# Patient Record
Sex: MALE | Race: WHITE | NOT HISPANIC OR LATINO | Employment: FULL TIME | URBAN - NONMETROPOLITAN AREA
[De-identification: names, ages, dates, MRNs, and addresses within clinical notes are randomized per-mention and may not be internally consistent; named-entity substitution may affect disease eponyms.]

---

## 2017-04-25 ENCOUNTER — OFFICE VISIT (OUTPATIENT)
Dept: URGENT CARE | Facility: PHYSICIAN GROUP | Age: 27
End: 2017-04-25
Payer: MEDICAID

## 2017-04-25 VITALS
HEIGHT: 67 IN | RESPIRATION RATE: 14 BRPM | DIASTOLIC BLOOD PRESSURE: 70 MMHG | TEMPERATURE: 98.1 F | HEART RATE: 84 BPM | SYSTOLIC BLOOD PRESSURE: 110 MMHG | WEIGHT: 210 LBS | BODY MASS INDEX: 32.96 KG/M2 | OXYGEN SATURATION: 96 %

## 2017-04-25 DIAGNOSIS — S46.311A TRICEPS STRAIN, RIGHT, INITIAL ENCOUNTER: ICD-10-CM

## 2017-04-25 PROCEDURE — 99214 OFFICE O/P EST MOD 30 MIN: CPT | Performed by: PHYSICIAN ASSISTANT

## 2017-04-25 ASSESSMENT — ENCOUNTER SYMPTOMS
FEVER: 0
CHILLS: 0
TINGLING: 0
LOSS OF CONSCIOUSNESS: 0
MUSCULOSKELETAL PAIN: 1
COUGH: 0
PALPITATIONS: 0
SHORTNESS OF BREATH: 0
FOCAL WEAKNESS: 0

## 2017-04-25 NOTE — PATIENT INSTRUCTIONS
Muscle Strain  A muscle strain is an injury that occurs when a muscle is stretched beyond its normal length. Usually a small number of muscle fibers are torn when this happens. Muscle strain is rated in degrees. First-degree strains have the least amount of muscle fiber tearing and pain. Second-degree and third-degree strains have increasingly more tearing and pain.   Usually, recovery from muscle strain takes 1-2 weeks. Complete healing takes 5-6 weeks.   CAUSES   Muscle strain happens when a sudden, violent force placed on a muscle stretches it too far. This may occur with lifting, sports, or a fall.   RISK FACTORS  Muscle strain is especially common in athletes.   SIGNS AND SYMPTOMS  At the site of the muscle strain, there may be:  · Pain.  · Bruising.  · Swelling.  · Difficulty using the muscle due to pain or lack of normal function.  DIAGNOSIS   Your health care provider will perform a physical exam and ask about your medical history.  TREATMENT   Often, the best treatment for a muscle strain is resting, icing, and applying cold compresses to the injured area.    HOME CARE INSTRUCTIONS   · Use the PRICE method of treatment to promote muscle healing during the first 2-3 days after your injury. The PRICE method involves:  ¨ Protecting the muscle from being injured again.  ¨ Restricting your activity and resting the injured body part.  ¨ Icing your injury. To do this, put ice in a plastic bag. Place a towel between your skin and the bag. Then, apply the ice and leave it on from 15-20 minutes each hour. After the third day, switch to moist heat packs.  ¨ Apply compression to the injured area with a splint or elastic bandage. Be careful not to wrap it too tightly. This may interfere with blood circulation or increase swelling.  ¨ Elevate the injured body part above the level of your heart as often as you can.  · Only take over-the-counter or prescription medicines for pain, discomfort, or fever as directed by your  health care provider.  · Warming up prior to exercise helps to prevent future muscle strains.  SEEK MEDICAL CARE IF:   · You have increasing pain or swelling in the injured area.  · You have numbness, tingling, or a significant loss of strength in the injured area.  MAKE SURE YOU:   · Understand these instructions.  · Will watch your condition.  · Will get help right away if you are not doing well or get worse.     This information is not intended to replace advice given to you by your health care provider. Make sure you discuss any questions you have with your health care provider.     Document Released: 12/18/2006 Document Revised: 10/08/2014 Document Reviewed: 07/17/2014  [employer] Interactive Patient Education ©2016 [employer]vier Inc.

## 2017-04-25 NOTE — PROGRESS NOTES
"Subjective:      José Miguel Bautista is a 27 y.o. male who presents with Muscle Pain            Muscle Pain  This is a new problem. The current episode started today. The problem occurs constantly. The problem has been unchanged. Pertinent negatives include no chest pain, chills, coughing, fever or rash. Associated symptoms comments: Sore right triceps muscle from lifting heavy objects.  . Exacerbated by: stretching arm. He has tried nothing for the symptoms.       Review of Systems   Constitutional: Negative for fever and chills.   Respiratory: Negative for cough and shortness of breath.    Cardiovascular: Negative for chest pain and palpitations.   Musculoskeletal:        Right upper arm pain   Skin: Negative for rash.   Neurological: Negative for tingling, focal weakness and loss of consciousness.     All other systems reviewed and are negative.  PMH:  has no past medical history on file.  MEDS:   Current outpatient prescriptions:   •  Diclofenac Sodium 1 % Gel, Apply 2-4 grams to affected area 4 times daily as needed for pain., Disp: 1 Tube, Rfl: 0  •  diphenhydrAMINE (BENADRYL) 25 MG TABS, Take 25 mg by mouth every 6 hours as needed for Sleep., Disp: , Rfl:   •  Diphenhydramine-APAP, sleep,  MG TABS, Take  by mouth., Disp: , Rfl:   •  fluticasone (FLONASE) 50 MCG/ACT nasal spray, Spray 2 Sprays in nose every day., Disp: 1 Bottle, Rfl: 1  ALLERGIES:   Allergies   Allergen Reactions   • Penicillins      SURGHX: History reviewed. No pertinent past surgical history.  SOCHX:  reports that he has been smoking.  He does not have any smokeless tobacco history on file. He reports that he drinks alcohol. He reports that he does not use illicit drugs.  FH: Family history was reviewed, no pertinent findings to report  Medications, Allergies, and current problem list reviewed today in Epic       Objective:     /70 mmHg  Pulse 84  Temp(Src) 36.7 °C (98.1 °F)  Resp 14  Ht 1.702 m (5' 7\")  Wt 95.255 kg (210 lb) "  BMI 32.88 kg/m2  SpO2 96%     Physical Exam   Constitutional: He is oriented to person, place, and time. He appears well-developed and well-nourished.   Cardiovascular: Normal rate, regular rhythm, normal heart sounds, intact distal pulses and normal pulses.    Pulmonary/Chest: Effort normal and breath sounds normal.   Musculoskeletal: Normal range of motion. He exhibits tenderness. He exhibits no edema or deformity.   Pain to palpation along the right triceps.  Full ROM.   Neurological: He is alert and oriented to person, place, and time. He has normal reflexes. He displays normal reflexes. He exhibits normal muscle tone. Coordination normal.   Skin: Skin is warm and dry.   Psychiatric: He has a normal mood and affect. His behavior is normal. Judgment and thought content normal.   Vitals reviewed.              Assessment/Plan:     1. Triceps strain, right, initial encounter  - ICE/NSAIDS/gentle stretching  - Diclofenac Sodium 1 % Gel; Apply 2-4 grams to affected area 4 times daily as needed for pain.  Dispense: 1 Tube; Refill: 0    Differential diagnosis, natural history, supportive care, and indications for immediate follow-up discussed at length.   Follow-up with primary care provider within 4-5 days, emergency room precautions discussed.  Patient and/or family appears understanding of information.

## 2017-04-25 NOTE — MR AVS SNAPSHOT
"José Miguel Bautista   2017 11:05 AM   Office Visit   MRN: [id number]    Department:  [facility]   Dept Phone:  [phone]    Description:  Male : 1990   Provider:  Elvin Stovall PA-C           Reason for Visit     Muscle Pain muscle strain in R/ bicept      Allergies as of 2017     Allergen Noted Reactions    Penicillins 2015         You were diagnosed with     Triceps strain, right, initial encounter   [[id number]]         Vital Signs     Blood Pressure Pulse Temperature Respirations Height Weight    110/70 mmHg 84 36.7 °C (98.1 °F) 14 1.702 m (5' 7\") 95.255 kg (210 lb)    Body Mass Index Oxygen Saturation Smoking Status             32.88 kg/m2 96% Current Every Day Smoker         Basic Information     Date Of Birth Sex Race Ethnicity Preferred Language    1990 Male White Non- English      Health Maintenance        Date Due Completion Dates    IMM DTaP/Tdap/Td Vaccine (1 - Tdap) 2008 ---            Current Immunizations     No immunizations on file.      Below and/or attached are the medications your provider expects you to take. Review all of your home medications and newly ordered medications with your provider and/or pharmacist. Follow medication instructions as directed by your provider and/or pharmacist. Please keep your medication list with you and share with your provider. Update the information when medications are discontinued, doses are changed, or new medications (including over-the-counter products) are added; and carry medication information at all times in the event of emergency situations     Allergies:  PENICILLINS - (reactions not documented)               Medications  Valid as of: 2017 - 11:22 AM    Generic Name Brand Name Tablet Size Instructions for use    Diclofenac Sodium (Gel) Diclofenac Sodium 1 % Apply 2-4 grams to affected area 4 times daily as needed for pain.        DiphenhydrAMINE HCl (Tab) BENADRYL 25 MG Take 25 mg by mouth " every 6 hours as needed for Sleep.        Diphenhydramine-APAP (sleep) (Tab) Diphenhydramine-APAP (sleep)  MG Take  by mouth.        Fluticasone Propionate (Suspension) FLONASE 50 MCG/ACT Spray 2 Sprays in nose every day.        .                 Medicines prescribed today were sent to:     [facility][id number] - [place], [place] - [address]    [address] [facility] [place] [address]    Phone: [phone] Fax: [phone]    Open 24 Hours?: No    [facility] [address] [facility], [place] - [address]    [address] [place] [address]    Phone: [phone] Fax: [phone]    Open 24 Hours?: No      Medication refill instructions:       If your prescription bottle indicates you have medication refills left, it is not necessary to call your provider’s office. Please contact your pharmacy and they will refill your medication.    If your prescription bottle indicates you do not have any refills left, you may request refills at any time through one of the following ways: The online [employer] system (except Urgent Care), by calling your provider’s office, or by asking your pharmacy to contact your provider’s office with a refill request. Medication refills are processed only during regular business hours and may not be available until the next business day. Your provider may request additional information or to have a follow-up visit with you prior to refilling your medication.   *Please Note: Medication refills are assigned a new Rx number when refilled electronically. Your pharmacy may indicate that no refills were authorized even though a new prescription for the same medication is available at the pharmacy. Please request the medicine by name with the pharmacy before contacting your provider for a refill.        Instructions    Muscle Strain  A muscle strain is an injury that occurs when a muscle is stretched beyond its normal length. Usually a small number of muscle fibers are torn when this  happens. Muscle strain is rated in degrees. First-degree strains have the least amount of muscle fiber tearing and pain. Second-degree and third-degree strains have increasingly more tearing and pain.   Usually, recovery from muscle strain takes 1-2 weeks. Complete healing takes 5-6 weeks.   CAUSES   Muscle strain happens when a sudden, violent force placed on a muscle stretches it too far. This may occur with lifting, sports, or a fall.   RISK FACTORS  Muscle strain is especially common in athletes.   SIGNS AND SYMPTOMS  At the site of the muscle strain, there may be:  · Pain.  · Bruising.  · Swelling.  · Difficulty using the muscle due to pain or lack of normal function.  DIAGNOSIS   Your health care provider will perform a physical exam and ask about your medical history.  TREATMENT   Often, the best treatment for a muscle strain is resting, icing, and applying cold compresses to the injured area.    HOME CARE INSTRUCTIONS   · Use the PRICE method of treatment to promote muscle healing during the first 2-3 days after your injury. The PRICE method involves:  ¨ Protecting the muscle from being injured again.  ¨ Restricting your activity and resting the injured body part.  ¨ Icing your injury. To do this, put ice in a plastic bag. Place a towel between your skin and the bag. Then, apply the ice and leave it on from 15-20 minutes each hour. After the third day, switch to moist heat packs.  ¨ Apply compression to the injured area with a splint or elastic bandage. Be careful not to wrap it too tightly. This may interfere with blood circulation or increase swelling.  ¨ Elevate the injured body part above the level of your heart as often as you can.  · Only take over-the-counter or prescription medicines for pain, discomfort, or fever as directed by your health care provider.  · Warming up prior to exercise helps to prevent future muscle strains.  SEEK MEDICAL CARE IF:   · You have increasing pain or swelling in the  injured area.  · You have numbness, tingling, or a significant loss of strength in the injured area.  MAKE SURE YOU:   · Understand these instructions.  · Will watch your condition.  · Will get help right away if you are not doing well or get worse.     This information is not intended to replace advice given to you by your health care provider. Make sure you discuss any questions you have with your health care provider.     Document Released: 12/18/2006 Document Revised: 10/08/2014 Document Reviewed: 07/17/2014  [employer] Interactive Patient Education ©2016 Elsevier Inc.            [email or web address] Access Code: [email or web address]  Expires: 5/25/2017 11:22 AM    Your email address is not on file at [facility].  Email Addresses are required for you to sign up for [email or web address], please contact [phone] to verify your personal information and to provide your email address prior to attempting to register for [email or web address].    José Miguel Marcosengale  [address]  [place] [address]    [employer]  A secure, online tool to manage your health information     [facility]’s [employer]com® is a secure, online tool that connects you to your personalized health information from the privacy of your home -- day or night - making it very easy for you to manage your healthcare. Once the activation process is completed, you can even access your medical information using the [employer] yoni, which is available for free in the Apple Yoni store or Google Play store.     To learn more about [email or web address], visit [email or web address]    There are two levels of access available (as shown below):   My Chart Features  [facility] Primary Care Doctor [facility]  Specialists [facility]  Urgent  Care Non-[facility] Primary Care Doctor   Email your healthcare team securely and privately 24/7 X X X    Manage appointments: schedule your next appointment; view details of past/upcoming appointments X      Request prescription refills. X      View recent personal medical records, including lab  and immunizations X X X X   View health record, including health history, allergies, medications X X X X   Read reports about your outpatient visits, procedures, consult and ER notes X X X X   See your discharge summary, which is a recap of your hospital and/or ER visit that includes your diagnosis, lab results, and care plan X X  X     How to register for [employer]:  Once your e-mail address has been verified, follow the following steps to sign up for [employer].     1. Go to  [email or web address]  2. Click on the Sign Up Now box, which takes you to the New Member Sign Up page. You will need to provide the following information:  a. Enter your [employer]is Access Code exactly as it appears at the top of this page. (You will not need to use this code after you’ve completed the sign-up process. If you do not sign up before the expiration date, you must request a new code.)   b. Enter your date of birth.   c. Enter your home email address.   d. Click Submit, and follow the next screen’s instructions.  3. Create a Epicrisis ID. This will be your Epic[employer]is login ID and cannot be changed, so think of one that is secure and easy to remember.  4. Create a [employer] password. You can change your password at any time.  5. Enter your Password Reset Question and Answer. This can be used at a later time if you forget your password.   6. Enter your e-mail address. This allows you to receive e-mail notifications when new information is available in [employer]is.  7. Click Sign Up. You can now view your health information.    For assistance activating your [employer] account, call [phone]         Quit Tobacco Information     Do you want to quit using tobacco?    Quitting tobacco decreases risks of cancer, heart and lung disease, increases life expectancy, improves sense of taste and smell, and increases spending money, among other benefits.    If you are thinking about quitting, we can help.  • [facility] Quit Tobacco Program:  [phone]  o Program occurs weekly for four weeks and includes pharmacist consultation on products to support quitting smoking or chewing tobacco. A provider referral is needed for pharmacist consultation.  • Tobacco Users Help Hotline: 9-800QUIT-NOW (180-6073) or https://[place].quitlogix.org/  o Free, confidential telephone and online coaching for [place] residents. Sessions are designed on a schedule that is convenient for you. Eligible clients receive free nicotine replacement therapy.  • Nationally: www.smokefree.gov  o Information and professional assistance to support both immediate and long-term needs as you become, and remain, a non-smoker. Smokefree.gov allows you to choose the help that best fits your needs.

## 2017-07-24 ENCOUNTER — OCCUPATIONAL MEDICINE (OUTPATIENT)
Dept: URGENT CARE | Facility: PHYSICIAN GROUP | Age: 27
End: 2017-07-24

## 2017-07-24 VITALS
TEMPERATURE: 97 F | DIASTOLIC BLOOD PRESSURE: 70 MMHG | RESPIRATION RATE: 16 BRPM | BODY MASS INDEX: 31.22 KG/M2 | OXYGEN SATURATION: 96 % | SYSTOLIC BLOOD PRESSURE: 110 MMHG | HEIGHT: 68 IN | HEART RATE: 72 BPM | WEIGHT: 206 LBS

## 2017-07-24 DIAGNOSIS — Z02.1 PRE-EMPLOYMENT DRUG SCREENING: ICD-10-CM

## 2017-07-24 DIAGNOSIS — Z01.10 ENCOUNTER FOR AUDIOLOGY EVALUATION: ICD-10-CM

## 2017-07-24 DIAGNOSIS — Z02.89 EXAMINATION, PHYSICAL, EMPLOYEE: Primary | ICD-10-CM

## 2017-07-24 LAB
AMP AMPHETAMINE: NORMAL
COC COCAINE: NORMAL
INT CON NEG: NORMAL
INT CON POS: NORMAL
MET METHAMPHETAMINES: NORMAL
OPI OPIATES: NORMAL
PCP PHENCYCLIDINE: NORMAL
POC DRUG COMMENT 753798-OCCUPATIONAL HEALTH: NEGATIVE
THC: NORMAL

## 2017-07-24 PROCEDURE — 92553 AUDIOMETRY AIR & BONE: CPT | Performed by: PHYSICIAN ASSISTANT

## 2017-07-24 PROCEDURE — 8915 PR COMPREHENSIVE PHYSICAL: Performed by: PHYSICIAN ASSISTANT

## 2017-07-24 PROCEDURE — 80305 DRUG TEST PRSMV DIR OPT OBS: CPT | Performed by: PHYSICIAN ASSISTANT

## 2017-07-24 ASSESSMENT — VISUAL ACUITY
OS_CC: 20/25
OD_CC: 20/30

## 2017-07-24 NOTE — MR AVS SNAPSHOT
"        José Miguel Abarcaale   2017 1:00 PM   Occupational Medicine   MRN: [id number]    Department:  [facility]   Dept Phone:  [phone]    Description:  Male : 1990   Provider:  José Miguel Chung PA-C           Reason for Visit     Employment Physical [employer]      Allergies as of 2017     Allergen Noted Reactions    Penicillins 2015         You were diagnosed with     Examination, physical, employee   [[id number]]  -  Primary     Encounter for audiology evaluation   [[id number]]       Pre-employment drug screening   [[id number]]         Vital Signs     Blood Pressure Pulse Temperature Respirations Height Weight    110/70 mmHg 72 36.1 °C (97 °F) 16 1.727 m (5' 8\") 93.441 kg (206 lb)    Body Mass Index Oxygen Saturation Smoking Status             31.33 kg/m2 96% Current Every Day Smoker         Basic Information     Date Of Birth Sex Race Ethnicity Preferred Language    1990 Male White Non- English      Health Maintenance        Date Due Completion Dates    IMM DTaP/Tdap/Td Vaccine (1 - Tdap) 2008 ---    IMM INFLUENZA (1) 2017 ---            Results     POCT 6 Panel Urine Drug Screen      Component    AMPHETAMINE    POC THC    COCAINE    OPIATES    PHENCYCLIDINE    METHAMPHETAMINES    POC Urine Drug Screen Comment    negative    Internal Control Positive    Valid    Internal Control Negative    Valid                        Current Immunizations     No immunizations on file.      Below and/or attached are the medications your provider expects you to take. Review all of your home medications and newly ordered medications with your provider and/or pharmacist. Follow medication instructions as directed by your provider and/or pharmacist. Please keep your medication list with you and share with your provider. Update the information when medications are discontinued, doses are changed, or new medications (including over-the-counter products) are added; and carry " medication information at all times in the event of emergency situations     Allergies:  PENICILLINS - (reactions not documented)               Medications  Valid as of: July 24, 2017 -  2:53 PM    Generic Name Brand Name Tablet Size Instructions for use    Diclofenac Sodium (Gel) Diclofenac Sodium 1 % Apply 2-4 grams to affected area 4 times daily as needed for pain.        Diclofenac Sodium (Gel) Diclofenac Sodium 1 % Apply 2-4 grams to area twice daily as needed for pain.        DiphenhydrAMINE HCl (Tab) BENADRYL 25 MG Take 25 mg by mouth every 6 hours as needed for Sleep.        Diphenhydramine-APAP (sleep) (Tab) Diphenhydramine-APAP (sleep)  MG Take  by mouth.        Fluticasone Propionate (Suspension) FLONASE 50 MCG/ACT Spray 2 Sprays in nose every day.        .                 Medicines prescribed today were sent to:     [facility][id number] - [place], [place] - [address] [address]    Phone: [phone] Fax: [phone]    Open 24 Hours?: No    [facility] [address] [place] [address] - [facility] [address]    Phone: [phone] Fax: [phone]    Open 24 Hours?: No      Medication refill instructions:       If your prescription bottle indicates you have medication refills left, it is not necessary to call your provider’s office. Please contact your pharmacy and they will refill your medication.    If your prescription bottle indicates you do not have any refills left, you may request refills at any time through one of the following ways: The online [employer] system (except Urgent Care), by calling your provider’s office, or by asking your pharmacy to contact your provider’s office with a refill request. Medication refills are processed only during regular business hours and may not be available until the next business day. Your provider may request additional information or to have a follow-up visit with you prior to refilling  your medication.   *Please Note: Medication refills are assigned a new Rx number when refilled electronically. Your pharmacy may indicate that no refills were authorized even though a new prescription for the same medication is available at the pharmacy. Please request the medicine by name with the pharmacy before contacting your provider for a refill.           [employer] Access Code: [email or web address]  Expires: 8/23/2017  2:04 PM    Your email address is not on file at [facility].  Email Addresses are required for you to sign up for [employer], please contact [phone] to verify your personal information and to provide your email address prior to attempting to register for [employer].    José Miguel Lily  [address] [place] [id number]    [employer]  A secure, online tool to manage your health information     [facility]’s [employer]® is a secure, online tool that connects you to your personalized health information from the privacy of your home -- day or night - making it very easy for you to manage your healthcare. Once the activation process is completed, you can even access your medical information using the [employer] yoni, which is available for free in the Apple Yoni store or Google Play store.     To learn more about [employer], visit [email or web address]    There are two levels of access available (as shown below):   My Chart Features  [facility] Primary Care Doctor [facility]  Specialists [facility]  Urgent  Care Non-[facility] Primary Care Doctor   Email your healthcare team securely and privately 24/7 X X X    Manage appointments: schedule your next appointment; view details of past/upcoming appointments X      Request prescription refills. X      View recent personal medical records, including lab and immunizations X X X X   View health record, including health history, allergies, medications X X X X   Read reports about your outpatient visits, procedures, consult and ER notes X X X X   See your discharge summary, which  is a recap of your hospital and/or ER visit that includes your diagnosis, lab results, and care plan X X  X     How to register for [employer]:  Once your e-mail address has been verified, follow the following steps to sign up for [employer].     1. Go to  [email or web address]  2. Click on the Sign Up Now box, which takes you to the New Member Sign Up page. You will need to provide the following information:  a. Enter your [employer] Access Code exactly as it appears at the top of this page. (You will not need to use this code after you’ve completed the sign-up process. If you do not sign up before the expiration date, you must request a new code.)   b. Enter your date of birth.   c. Enter your home email address.   d. Click Submit, and follow the next screen’s instructions.  3. Create a [employer] ID. This will be your [employer] login ID and cannot be changed, so think of one that is secure and easy to remember.  4. Create a [employer] password. You can change your password at any time.  5. Enter your Password Reset Question and Answer. This can be used at a later time if you forget your password.   6. Enter your e-mail address. This allows you to receive e-mail notifications when new information is available in [employer].  7. Click Sign Up. You can now view your health information.    For assistance activating your [employer] account, call [phone]         Quit Tobacco Information     Do you want to quit using tobacco?    Quitting tobacco decreases risks of cancer, heart and lung disease, increases life expectancy, improves sense of taste and smell, and increases spending money, among other benefits.    If you are thinking about quitting, we can help.  • [employer] Quit Tobacco Program: 228.215.8589  o Program occurs weekly for four weeks and includes pharmacist consultation on products to support quitting smoking or chewing tobacco. A provider referral is needed for pharmacist consultation.  • Tobacco Users Help Hotline:  1-800-QUIT-NOW (161-9800) or https://[place].quitlogix.org/  o Free, confidential telephone and online coaching for [place] residents. Sessions are designed on a schedule that is convenient for you. Eligible clients receive free nicotine replacement therapy.  • Nationally: www.smokefree.gov  o Information and professional assistance to support both immediate and long-term needs as you become, and remain, a non-smoker. Smokefree.gov allows you to choose the help that best fits your needs.

## 2017-07-24 NOTE — PROGRESS NOTES
27 y.o. male comes in for a preemployment physical. No major medical history, no chronic conditions, no chronic medications. No history of asthma, heart disease, murmurs, seizure disorder or syncopal episodes with activity.  Please see the chart for further information, however normal physical exam, cleared for employment without restrictions.

## 2017-09-05 ENCOUNTER — NON-PROVIDER VISIT (OUTPATIENT)
Dept: URGENT CARE | Facility: PHYSICIAN GROUP | Age: 27
End: 2017-09-05

## 2017-09-05 DIAGNOSIS — Z02.1 PRE-EMPLOYMENT DRUG SCREENING: ICD-10-CM

## 2017-09-05 LAB
AMP AMPHETAMINE: NORMAL
COC COCAINE: NORMAL
INT CON NEG: NEGATIVE
INT CON POS: POSITIVE
MET METHAMPHETAMINES: NORMAL
OPI OPIATES: NORMAL
PCP PHENCYCLIDINE: NORMAL
POC DRUG COMMENT 753798-OCCUPATIONAL HEALTH: NEGATIVE
THC: NORMAL

## 2017-09-05 PROCEDURE — 80305 DRUG TEST PRSMV DIR OPT OBS: CPT | Performed by: FAMILY MEDICINE

## 2017-12-20 ENCOUNTER — NON-PROVIDER VISIT (OUTPATIENT)
Dept: URGENT CARE | Facility: PHYSICIAN GROUP | Age: 27
End: 2017-12-20

## 2017-12-20 DIAGNOSIS — Z02.1 PRE-EMPLOYMENT DRUG SCREENING: ICD-10-CM

## 2017-12-20 LAB
AMP AMPHETAMINE: NORMAL
COC COCAINE: NORMAL
INT CON NEG: NORMAL
INT CON POS: NORMAL
MET METHAMPHETAMINES: NORMAL
OPI OPIATES: NORMAL
PCP PHENCYCLIDINE: NORMAL
POC DRUG COMMENT 753798-OCCUPATIONAL HEALTH: NORMAL
THC: NORMAL

## 2017-12-20 PROCEDURE — 80305 DRUG TEST PRSMV DIR OPT OBS: CPT | Performed by: PHYSICIAN ASSISTANT

## 2023-12-04 ENCOUNTER — APPOINTMENT (OUTPATIENT)
Dept: URGENT CARE | Facility: PHYSICIAN GROUP | Age: 33
End: 2023-12-04

## 2023-12-04 ENCOUNTER — HOSPITAL ENCOUNTER (EMERGENCY)
Facility: MEDICAL CENTER | Age: 33
End: 2023-12-04
Attending: STUDENT IN AN ORGANIZED HEALTH CARE EDUCATION/TRAINING PROGRAM

## 2023-12-04 VITALS
RESPIRATION RATE: 17 BRPM | WEIGHT: 176.15 LBS | HEART RATE: 85 BPM | SYSTOLIC BLOOD PRESSURE: 111 MMHG | BODY MASS INDEX: 26.7 KG/M2 | OXYGEN SATURATION: 95 % | DIASTOLIC BLOOD PRESSURE: 77 MMHG | HEIGHT: 68 IN | TEMPERATURE: 98.5 F

## 2023-12-04 DIAGNOSIS — R73.9 HYPERGLYCEMIA: ICD-10-CM

## 2023-12-04 LAB
ALBUMIN SERPL BCP-MCNC: 4.5 G/DL (ref 3.2–4.9)
ALBUMIN/GLOB SERPL: 1.4 G/DL
ALP SERPL-CCNC: 133 U/L (ref 30–99)
ALT SERPL-CCNC: 27 U/L (ref 2–50)
ANION GAP SERPL CALC-SCNC: 14 MMOL/L (ref 7–16)
AST SERPL-CCNC: 23 U/L (ref 12–45)
B-OH-BUTYR SERPL-MCNC: 0.31 MMOL/L (ref 0.02–0.27)
BASOPHILS # BLD AUTO: 1 % (ref 0–1.8)
BASOPHILS # BLD: 0.04 K/UL (ref 0–0.12)
BILIRUB SERPL-MCNC: 0.7 MG/DL (ref 0.1–1.5)
BUN SERPL-MCNC: 16 MG/DL (ref 8–22)
CALCIUM ALBUM COR SERPL-MCNC: 9 MG/DL (ref 8.5–10.5)
CALCIUM SERPL-MCNC: 9.4 MG/DL (ref 8.5–10.5)
CHLORIDE SERPL-SCNC: 96 MMOL/L (ref 96–112)
CO2 SERPL-SCNC: 21 MMOL/L (ref 20–33)
CREAT SERPL-MCNC: 0.86 MG/DL (ref 0.5–1.4)
EOSINOPHIL # BLD AUTO: 0.01 K/UL (ref 0–0.51)
EOSINOPHIL NFR BLD: 0.2 % (ref 0–6.9)
ERYTHROCYTE [DISTWIDTH] IN BLOOD BY AUTOMATED COUNT: 35.3 FL (ref 35.9–50)
EST. AVERAGE GLUCOSE BLD GHB EST-MCNC: 229 MG/DL
GFR SERPLBLD CREATININE-BSD FMLA CKD-EPI: 116 ML/MIN/1.73 M 2
GLOBULIN SER CALC-MCNC: 3.2 G/DL (ref 1.9–3.5)
GLUCOSE BLD STRIP.AUTO-MCNC: 218 MG/DL (ref 65–99)
GLUCOSE BLD STRIP.AUTO-MCNC: 351 MG/DL (ref 65–99)
GLUCOSE SERPL-MCNC: 333 MG/DL (ref 65–99)
HBA1C MFR BLD: 9.6 % (ref 4–5.6)
HCT VFR BLD AUTO: 46 % (ref 42–52)
HGB BLD-MCNC: 17.2 G/DL (ref 14–18)
IMM GRANULOCYTES # BLD AUTO: 0.04 K/UL (ref 0–0.11)
IMM GRANULOCYTES NFR BLD AUTO: 1 % (ref 0–0.9)
LYMPHOCYTES # BLD AUTO: 1.24 K/UL (ref 1–4.8)
LYMPHOCYTES NFR BLD: 29.8 % (ref 22–41)
MAGNESIUM SERPL-MCNC: 1.9 MG/DL (ref 1.5–2.5)
MCH RBC QN AUTO: 31 PG (ref 27–33)
MCHC RBC AUTO-ENTMCNC: 37.4 G/DL (ref 32.3–36.5)
MCV RBC AUTO: 82.9 FL (ref 81.4–97.8)
MONOCYTES # BLD AUTO: 0.34 K/UL (ref 0–0.85)
MONOCYTES NFR BLD AUTO: 8.2 % (ref 0–13.4)
NEUTROPHILS # BLD AUTO: 2.49 K/UL (ref 1.82–7.42)
NEUTROPHILS NFR BLD: 59.8 % (ref 44–72)
NRBC # BLD AUTO: 0 K/UL
NRBC BLD-RTO: 0 /100 WBC (ref 0–0.2)
OSMOLALITY SERPL: 293 MOSM/KG H2O (ref 278–298)
PHOSPHATE SERPL-MCNC: 3 MG/DL (ref 2.5–4.5)
PLATELET # BLD AUTO: 203 K/UL (ref 164–446)
PMV BLD AUTO: 11.2 FL (ref 9–12.9)
POTASSIUM SERPL-SCNC: 4.4 MMOL/L (ref 3.6–5.5)
PROT SERPL-MCNC: 7.7 G/DL (ref 6–8.2)
RBC # BLD AUTO: 5.55 M/UL (ref 4.7–6.1)
SODIUM SERPL-SCNC: 131 MMOL/L (ref 135–145)
WBC # BLD AUTO: 4.2 K/UL (ref 4.8–10.8)

## 2023-12-04 PROCEDURE — 700105 HCHG RX REV CODE 258

## 2023-12-04 PROCEDURE — 93005 ELECTROCARDIOGRAM TRACING: CPT | Performed by: STUDENT IN AN ORGANIZED HEALTH CARE EDUCATION/TRAINING PROGRAM

## 2023-12-04 PROCEDURE — 99285 EMERGENCY DEPT VISIT HI MDM: CPT

## 2023-12-04 PROCEDURE — 80053 COMPREHEN METABOLIC PANEL: CPT

## 2023-12-04 PROCEDURE — 96372 THER/PROPH/DIAG INJ SC/IM: CPT

## 2023-12-04 PROCEDURE — 83930 ASSAY OF BLOOD OSMOLALITY: CPT

## 2023-12-04 PROCEDURE — 36415 COLL VENOUS BLD VENIPUNCTURE: CPT

## 2023-12-04 PROCEDURE — 84100 ASSAY OF PHOSPHORUS: CPT

## 2023-12-04 PROCEDURE — 83735 ASSAY OF MAGNESIUM: CPT

## 2023-12-04 PROCEDURE — 85025 COMPLETE CBC W/AUTO DIFF WBC: CPT

## 2023-12-04 PROCEDURE — 700105 HCHG RX REV CODE 258: Performed by: STUDENT IN AN ORGANIZED HEALTH CARE EDUCATION/TRAINING PROGRAM

## 2023-12-04 PROCEDURE — 83036 HEMOGLOBIN GLYCOSYLATED A1C: CPT

## 2023-12-04 PROCEDURE — 82010 KETONE BODYS QUAN: CPT

## 2023-12-04 PROCEDURE — 82962 GLUCOSE BLOOD TEST: CPT

## 2023-12-04 PROCEDURE — 700102 HCHG RX REV CODE 250 W/ 637 OVERRIDE(OP): Performed by: STUDENT IN AN ORGANIZED HEALTH CARE EDUCATION/TRAINING PROGRAM

## 2023-12-04 RX ORDER — SODIUM CHLORIDE 9 MG/ML
1000 INJECTION, SOLUTION INTRAVENOUS ONCE
Status: COMPLETED | OUTPATIENT
Start: 2023-12-04 | End: 2023-12-04

## 2023-12-04 RX ADMIN — SODIUM CHLORIDE 1000 ML: 9 INJECTION, SOLUTION INTRAVENOUS at 21:44

## 2023-12-04 RX ADMIN — SODIUM CHLORIDE 1000 ML: 9 INJECTION, SOLUTION INTRAVENOUS at 19:30

## 2023-12-04 RX ADMIN — INSULIN HUMAN 10 UNITS: 100 INJECTION, SOLUTION PARENTERAL at 20:26

## 2023-12-05 LAB — EKG IMPRESSION: NORMAL

## 2023-12-05 NOTE — ED NOTES
PIV est, lab is adding on new blood work orders  NS bolus running  Urinal at BS  Call light in reach and pt on the monitor  Denies any other needs at this time

## 2023-12-05 NOTE — ED NOTES
Second bolus infusing per ERP  Pt resting in NAD, call light in reach  Urinal in reach- pt aware needing urine sample  Denies any other needs at this time

## 2023-12-05 NOTE — ED NOTES
Pt signed and given d/c instructions after discussion w/ ERP. Results reviewed including A1C- pt is currently working on obtaining health insurance. Discussed trying the health marketplace to look at options also. Provided resources, such as [facility], for low income/no health insurance to est PCP for f/u especially in regards to diabetes. Pt denies further questions/concerns.  Pt ambulated out of the dept w/ steady gait A&O x4 w/ all belongings

## 2023-12-05 NOTE — ED PROVIDER NOTES
ED Provider Note    CHIEF COMPLAINT  Chief Complaint   Patient presents with    High Blood Sugar     States blood sugar has been over 500 for about 3 days. States took 30 units of lantus this morning. Also takes metformin. Reports polyuria & polydipsia.        EXTERNAL RECORDS REVIEWED  No pertinent external notes available    HPI/ROS  LIMITATION TO HISTORY   None  OUTSIDE HISTORIAN(S):  none    José Miguel Bautista is a 33 y.o. male with past medical history of type 2 diabetes presenting to the emergency department for elevated blood sugar.  Patient says that for the last 3 days, his blood sugar has been elevated in the 300-500 range.  Says that he took 30 units of Lantus this morning, has been compliant with his metformin.  He has been having polyuria, polydipsia but no dysuria.  No flank pain, fever, chills.  No cough, congestion, shortness of breath, chest discomfort.    Has never had similar difficulty managing his blood sugar in the past.  He was diagnosed with diabetes approximately 2 years ago.    PAST MEDICAL HISTORY   has a past medical history of Diabetes (HCC).    SURGICAL HISTORY  patient denies any surgical history    FAMILY HISTORY  History reviewed. No pertinent family history.    SOCIAL HISTORY  Social History     Tobacco Use    Smoking status: Former     Types: Cigarettes    Smokeless tobacco: Not on file   Substance and Sexual Activity    Alcohol use: Not Currently    Drug use: Not Currently    Sexual activity: Not on file       CURRENT MEDICATIONS  Home Medications       Reviewed by Marquita Ontiveros R.N. (Registered Nurse) on 12/04/23 at 1736  Med List Status: Partial     Medication Last Dose Status   Diclofenac Sodium (VOLTAREN) 1 % Gel  Active   Diclofenac Sodium 1 % Gel  Active   diphenhydrAMINE (BENADRYL) 25 MG TABS  Active   Diphenhydramine-APAP, sleep,  MG TABS  Active   fluticasone (FLONASE) 50 MCG/ACT nasal spray  Active                    ALLERGIES  No Active Allergies    PHYSICAL  "EXAM  VITAL SIGNS: /77   Pulse 85   Temp 36.9 °C (98.5 °F) (Temporal)   Resp 17   Ht 1.727 m (5' 8\")   Wt 79.9 kg (176 lb 2.4 oz)   SpO2 95%   BMI 26.78 kg/m²    General: Well- appearing , non-toxic, no acute distress  Neuro: oriented x 3, moving all extremities.   HEENT:   - Head: Normocephalic, atraumatic  - Eyes: PERRL  - Ears/Nose: normal external nose and ears  - Mouth: moist mucosal membranes  Resp: clear to auscultation, no increased work of breathing, not tachypneic.  CV: Regular rate and rhythm  Abd: Soft, non-tender, non-distended  Extremities: No peripheral edema  Psych: lucid and conversational         DIAGNOSTIC STUDIES / PROCEDURES    EKG  My independent EKG interpretation:  Results for orders placed or performed during the hospital encounter of 23   EKG (NOW)   Result Value Ref Range    Report       [facility] Emergency Dept.    Test Date:  2023  Pt Name:    MAGNUS GUPTA            Department: ER  MRN:        [id number]                      Room:       [facility]  Gender:     Male                         Technician: [id number]  :        1990                   Requested By:NUBIA STEWART  Order #:    [id number]                    Reading MD: Nubia Stewart    Measurements  Intervals                                Axis  Rate:       80                           P:          49  WA:         220                          QRS:        33  QRSD:       122                          T:          -5  QT:         343  QTc:        396    Interpretive Statements  Sinus rhythm  Prolonged WA interval  Nonspecific intraventricular conduction delay  Borderline T abnormalities, inferior leads    Electronically Signed On 2023 00:16:47 PST by Nubia Stewart         LABS  Results for orders placed or performed during the hospital encounter of 23   CBC with Differential   Result Value Ref Range    WBC 4.2 (L) 4.8 - 10.8 K/uL    RBC 5.55 4.70 - 6.10 M/uL    Hemoglobin 17.2 " 14.0 - 18.0 g/dL    Hematocrit 46.0 42.0 - 52.0 %    MCV 82.9 81.4 - 97.8 fL    MCH 31.0 27.0 - 33.0 pg    MCHC 37.4 (H) 32.3 - 36.5 g/dL    RDW 35.3 (L) 35.9 - 50.0 fL    Platelet Count 203 164 - 446 K/uL    MPV 11.2 9.0 - 12.9 fL    Neutrophils-Polys 59.80 44.00 - 72.00 %    Lymphocytes 29.80 22.00 - 41.00 %    Monocytes 8.20 0.00 - 13.40 %    Eosinophils 0.20 0.00 - 6.90 %    Basophils 1.00 0.00 - 1.80 %    Immature Granulocytes 1.00 (H) 0.00 - 0.90 %    Nucleated RBC 0.00 0.00 - 0.20 /100 WBC    Neutrophils (Absolute) 2.49 1.82 - 7.42 K/uL    Lymphs (Absolute) 1.24 1.00 - 4.80 K/uL    Monos (Absolute) 0.34 0.00 - 0.85 K/uL    Eos (Absolute) 0.01 0.00 - 0.51 K/uL    Baso (Absolute) 0.04 0.00 - 0.12 K/uL    Immature Granulocytes (abs) 0.04 0.00 - 0.11 K/uL    NRBC (Absolute) 0.00 K/uL   Comp Metabolic Panel   Result Value Ref Range    Sodium 131 (L) 135 - 145 mmol/L    Potassium 4.4 3.6 - 5.5 mmol/L    Chloride 96 96 - 112 mmol/L    Co2 21 20 - 33 mmol/L    Anion Gap 14.0 7.0 - 16.0    Glucose 333 (H) 65 - 99 mg/dL    Bun 16 8 - 22 mg/dL    Creatinine 0.86 0.50 - 1.40 mg/dL    Calcium 9.4 8.5 - 10.5 mg/dL    Correct Calcium 9.0 8.5 - 10.5 mg/dL    AST(SGOT) 23 12 - 45 U/L    ALT(SGPT) 27 2 - 50 U/L    Alkaline Phosphatase 133 (H) 30 - 99 U/L    Total Bilirubin 0.7 0.1 - 1.5 mg/dL    Albumin 4.5 3.2 - 4.9 g/dL    Total Protein 7.7 6.0 - 8.2 g/dL    Globulin 3.2 1.9 - 3.5 g/dL    A-G Ratio 1.4 g/dL   PHOSPHORUS   Result Value Ref Range    Phosphorus 3.0 2.5 - 4.5 mg/dL   MAGNESIUM   Result Value Ref Range    Magnesium 1.9 1.5 - 2.5 mg/dL   OSMOLALITY SERUM   Result Value Ref Range    Osmolality Serum 293 278 - 298 mOsm/kg H2O   BETA-HYDROXYBUTYRIC ACID   Result Value Ref Range    beta-Hydroxybutyric Acid 0.31 (H) 0.02 - 0.27 mmol/L   ESTIMATED GFR   Result Value Ref Range    GFR (CKD-EPI) 116 >60 mL/min/1.73 m 2   HEMOGLOBIN A1C   Result Value Ref Range    Glycohemoglobin 9.6 (H) 4.0 - 5.6 %    Est Avg Glucose 229  mg/dL   EKG (NOW)   Result Value Ref Range    Report       [facility] Emergency Dept.    Test Date:  2023  Pt Name:    MAGNUS GUPTA            Department: ER  MRN:        [id number]                      Room:       [facility]  Gender:     Male                         Technician: [id number]  :        1990                   Requested By:NUBIA STEWART  Order #:    [id number]                    Reading MD: Nubia Stewart    Measurements  Intervals                                Axis  Rate:       80                           P:          49  NC:         220                          QRS:        33  QRSD:       122                          T:          -5  QT:         343  QTc:        396    Interpretive Statements  Sinus rhythm  Prolonged NC interval  Nonspecific intraventricular conduction delay  Borderline T abnormalities, inferior leads    Electronically Signed On 2023 00:16:47 PST by Nubia Stewart     POCT glucose device results   Result Value Ref Range    POC Glucose, Blood 351 (H) 65 - 99 mg/dL   POCT glucose device results   Result Value Ref Range    POC Glucose, Blood 218 (H) 65 - 99 mg/dL       RADIOLOGY  I have independently interpreted the diagnostic imaging associated with this visit and am waiting the final reading from the radiologist.   My preliminary interpretation is as follows:   -   Radiologist interpretation:   No orders to display           MEDICAL DECISION MAKING    ED Observation Status? Yes; I am placing the patient in to an observation status due to a diagnostic uncertainty as well as therapeutic intensity. Patient placed in observation status at 7:32 PM, 2023.     Observation plan is as follows: Obtain baseline labs, administer IV fluids, insulin as needed, electrolyte replenishment as needed    Upon Reevaluation, the patient's condition has: Improved; and will be discharged.    Patient discharged from ED Observation status at 11:30 PM on 2023    ED COURSE  AND PLAN    José Miguel Bautista is a 33 y.o. male with a history of type 2 diabetes presenting to the emergency department for persistently elevated blood glucose.  In the emergency department, patient is well-appearing with stable vital signs.  Chemistry reveals hyperglycemia with blood glucose in the 300s but his bicarb is normal he has no anion gap and his electrolytes are otherwise unremarkable.  Renal function is preserved.  BHB is 0.31.  CBC is unremarkable.  Mag and Phos are otherwise unremarkable.  Administered IV fluids, subcutaneous insulin  Presentation not consistent with DKA, HHS.  Patient's A1c was 9.6, I had a long conversation regarding outpatient diabetes care, patient has limited financial resources, no access to healthcare currently and does not have a primary physician.  I recommended he obtain healthcare insurance, I made a note to our  to help him find a primary MD.  Patient was unable to provide a urine sample despite aggressive hydration with 2 L of crystalloid.  At this time he wants to go home, does not want to wait around to provide a urine sample.  Blood sugar dropped appropriately, repeat after 10 units of insulin was 222.  Patient has adequate outpatient supplies for his current regimen of metformin and Lantus.  Likely needs to start a third agent or potentially increase his metformin dose, I will defer to his primary doctor for this.      ---Pertinent ED Course---:    7:32 PM I reviewed the patient's old records in Epic, medication list, allergies, past medical history and performed a physical examination.     9:30 PM reevaluated patient, clinically stable    11:30 PM reevaluated patient, unable to obtain urine sample, patient wants to go home at this time, no indication for further diagnostic work-up, is appropriate for discharge, return precautions more.      HYDRATION: Based on the patient's presentation of Dehydration the patient was given IV fluids. IV Hydration was used  because oral hydration was not adequate alone. Upon recheck following hydration, the patient was improved.      Procedures:      ----------------------------------------------------------------------------------  DISCUSSIONS    I have discussed management of the patient with the following physicians and RICK's:      Discussion of management with other Q or appropriate source(s):     Escalation of care considered, and ultimately not performed: Considered but no indication for advanced imaging, chest x-ray    Barriers to care at this time, including but not limited to: Limited financial resources, no insurance, limited access to outpatient primary care    Decision tools and prescription drugs considered including, but not limited to: No indication for admission, antibiotics      FINAL IMPRESSION    1. Hyperglycemia          DISPOSITION    Home, Stable    Discharge: Diagnostic tests were reviewed and questions answered. Diagnosis, care plan and treatment options were discussed. The patient verbalizes understanding of the diagnosis, instructions, and agrees to follow up as directed.        This chart was dictated using an electronic voice recognition software. The chart has been reviewed and edited but there is still possibility for dictation errors due to limitation of software.    Herbie Stewart,  12/4/2023

## 2023-12-05 NOTE — ED TRIAGE NOTES
Chief Complaint   Patient presents with    High Blood Sugar     States blood sugar has been over 500 for about 3 days. States took 30 units of lantus this morning. Also takes metformin. Reports polyuria & polydipsia.      Pt ambulates to triage for above.      in triage. Protocol initiated, pt to stephanie, educated on triage process, thanked for patience.

## 2023-12-05 NOTE — ED NOTES
Break RN just at BS w/ pt who has still not voided and states he is still unable to void/ provide urine sample   ERP notified of pt still unable to provide urine sample

## 2023-12-05 NOTE — DISCHARGE INSTRUCTIONS
You are seen in the emergency department for elevated blood sugar.    Your blood sugar as well as your hemoglobin A1c was markedly elevated.  We gave you IV fluids as well as subcutaneous insulin which helped with your elevated blood sugar.  We think that the cause of your persistently elevated blood glucose is worsening diabetes.  You should follow-up with your primary care physician, you might benefit from a change in the dosage of your medications and potentially an additional medication to help with your A1c.    Please reduce your intake of sugary foods, processed and refined sugars.

## 2023-12-07 ENCOUNTER — OFFICE VISIT (OUTPATIENT)
Dept: INTERNAL MEDICINE | Facility: OTHER | Age: 33
End: 2023-12-07

## 2023-12-07 VITALS
HEIGHT: 68 IN | OXYGEN SATURATION: 94 % | TEMPERATURE: 98.5 F | WEIGHT: 180.4 LBS | BODY MASS INDEX: 27.34 KG/M2 | SYSTOLIC BLOOD PRESSURE: 115 MMHG | HEART RATE: 81 BPM | DIASTOLIC BLOOD PRESSURE: 73 MMHG

## 2023-12-07 DIAGNOSIS — Z11.4 ENCOUNTER FOR SCREENING FOR HIV: ICD-10-CM

## 2023-12-07 DIAGNOSIS — Z90.49 STATUS POST CHOLECYSTECTOMY: ICD-10-CM

## 2023-12-07 DIAGNOSIS — K21.9 GASTROESOPHAGEAL REFLUX DISEASE, UNSPECIFIED WHETHER ESOPHAGITIS PRESENT: ICD-10-CM

## 2023-12-07 DIAGNOSIS — Z91.199 HISTORY OF NONADHERENCE TO MEDICAL TREATMENT: ICD-10-CM

## 2023-12-07 DIAGNOSIS — Z90.89 STATUS POST TONSILLECTOMY: ICD-10-CM

## 2023-12-07 DIAGNOSIS — K21.9 GASTROESOPHAGEAL REFLUX DISEASE WITHOUT ESOPHAGITIS: ICD-10-CM

## 2023-12-07 DIAGNOSIS — E66.3 OVERWEIGHT: ICD-10-CM

## 2023-12-07 DIAGNOSIS — Z23 NEED FOR TDAP VACCINATION: ICD-10-CM

## 2023-12-07 DIAGNOSIS — F17.200 TOBACCO USE DISORDER: ICD-10-CM

## 2023-12-07 DIAGNOSIS — Z11.59 NEED FOR HEPATITIS C SCREENING TEST: ICD-10-CM

## 2023-12-07 DIAGNOSIS — Z11.3 SCREEN FOR STD (SEXUALLY TRANSMITTED DISEASE): ICD-10-CM

## 2023-12-07 DIAGNOSIS — Z91.199 PERSONAL HISTORY OF NONADHERENCE TO MEDICAL TREATMENT: ICD-10-CM

## 2023-12-07 DIAGNOSIS — M92.71 FREIBERG'S DISEASE, RIGHT: ICD-10-CM

## 2023-12-07 DIAGNOSIS — E11.65 UNCONTROLLED TYPE 2 DIABETES MELLITUS WITH HYPERGLYCEMIA (HCC): ICD-10-CM

## 2023-12-07 PROCEDURE — 90472 IMMUNIZATION ADMIN EACH ADD: CPT | Performed by: STUDENT IN AN ORGANIZED HEALTH CARE EDUCATION/TRAINING PROGRAM

## 2023-12-07 PROCEDURE — 90715 TDAP VACCINE 7 YRS/> IM: CPT | Performed by: STUDENT IN AN ORGANIZED HEALTH CARE EDUCATION/TRAINING PROGRAM

## 2023-12-07 PROCEDURE — 3078F DIAST BP <80 MM HG: CPT | Mod: GC | Performed by: STUDENT IN AN ORGANIZED HEALTH CARE EDUCATION/TRAINING PROGRAM

## 2023-12-07 PROCEDURE — 99204 OFFICE O/P NEW MOD 45 MIN: CPT | Mod: 25,GC | Performed by: STUDENT IN AN ORGANIZED HEALTH CARE EDUCATION/TRAINING PROGRAM

## 2023-12-07 PROCEDURE — 3074F SYST BP LT 130 MM HG: CPT | Mod: GC | Performed by: STUDENT IN AN ORGANIZED HEALTH CARE EDUCATION/TRAINING PROGRAM

## 2023-12-07 PROCEDURE — 90471 IMMUNIZATION ADMIN: CPT | Performed by: STUDENT IN AN ORGANIZED HEALTH CARE EDUCATION/TRAINING PROGRAM

## 2023-12-07 PROCEDURE — 90677 PCV20 VACCINE IM: CPT | Performed by: STUDENT IN AN ORGANIZED HEALTH CARE EDUCATION/TRAINING PROGRAM

## 2023-12-07 RX ORDER — METFORMIN HYDROCHLORIDE EXTENDED-RELEASE TABLETS 1000 MG/1
2000 TABLET, FILM COATED, EXTENDED RELEASE ORAL
Qty: 180 TABLET | Refills: 0 | Status: CANCELLED | OUTPATIENT
Start: 2023-12-07 | End: 2024-03-06

## 2023-12-07 RX ORDER — LANCETS 30 GAUGE
EACH MISCELLANEOUS
Qty: 100 EACH | Refills: 0 | Status: SHIPPED | OUTPATIENT
Start: 2023-12-07

## 2023-12-07 RX ORDER — GLUCOSAMINE HCL/CHONDROITIN SU 500-400 MG
CAPSULE ORAL
Qty: 100 EACH | Refills: 0 | Status: SHIPPED | OUTPATIENT
Start: 2023-12-07

## 2023-12-07 RX ORDER — ATORVASTATIN CALCIUM 20 MG/1
20 TABLET, FILM COATED ORAL NIGHTLY
Qty: 90 TABLET | Refills: 0 | Status: SHIPPED | OUTPATIENT
Start: 2023-12-07 | End: 2024-03-06

## 2023-12-07 ASSESSMENT — ANXIETY QUESTIONNAIRES
1. FEELING NERVOUS, ANXIOUS, OR ON EDGE: SEVERAL DAYS
5. BEING SO RESTLESS THAT IT IS HARD TO SIT STILL: NOT AT ALL
GAD7 TOTAL SCORE: 1
7. FEELING AFRAID AS IF SOMETHING AWFUL MIGHT HAPPEN: NOT AT ALL
4. TROUBLE RELAXING: NOT AT ALL
2. NOT BEING ABLE TO STOP OR CONTROL WORRYING: NOT AT ALL
3. WORRYING TOO MUCH ABOUT DIFFERENT THINGS: NOT AT ALL
6. BECOMING EASILY ANNOYED OR IRRITABLE: NOT AT ALL

## 2023-12-07 ASSESSMENT — ENCOUNTER SYMPTOMS
PALPITATIONS: 0
WEIGHT LOSS: 0
DIARRHEA: 0
DIZZINESS: 0
CONSTIPATION: 0
FEVER: 0
VOMITING: 0
MYALGIAS: 0
DEPRESSION: 0
NAUSEA: 0
SHORTNESS OF BREATH: 0
COUGH: 0
CHILLS: 0
WEAKNESS: 0

## 2023-12-07 ASSESSMENT — PATIENT HEALTH QUESTIONNAIRE - PHQ9: CLINICAL INTERPRETATION OF PHQ2 SCORE: 0

## 2023-12-07 ASSESSMENT — FIBROSIS 4 INDEX: FIB4 SCORE: 0.74

## 2023-12-07 NOTE — PROGRESS NOTES
Chief Complaint:  Establish care as new patient    History of Present Illness:     Patient is a 33 y.o. with PMHx of:     Diabetes mellitus, ?type II, insulin-dependent, uncontrolled c/b episodes of HHS and hyperglycemia- metformin, diagnosed at 32  Frieberg disease, right foot- diagnosed at 22  Overweight  Intermittent GERD, diet controlled  Tobacco use disorder  History of medical non adherence due to financial constraints  Status post cholecystectomy 2014  Status post tonsillectomy at 9 years of age    presenting to clinic today to establish care and for post emergency department follow-up.     Moved here from [place] in July. States he was following a diabetes doctor in [place].     Patient presented to the ED for persistently elevated blood glucose.  Chemistry revealed hyperglycemia with blood glucose 300s but bicarb normal without anion gap and electrolytes otherwise unremarkable.  BHB was found to be 0.31.  He was given IV fluids and subcutaneous insulin.  A1c 9.6.     Diabetes  Diagnosed at 32 years of age. States he is a  and his fingers are difficult to obtain blood from interested in glucose monitor or insulin pump. States he is unsure if he was tested for type 1 DM. [facility] last A1c was 5.9 8 months. Has not been adherent to DM diet. Taking levamir 10U qAM, metformin 2000 qdaily. Amenable to nutritionist as he feels he does not know what he can and cannot eat. States since being in ER he has fixed his diet. Has never been statin. Has tried Trulicity. Requesting dental apt.     Fasting morning glucose- low 200s   After lunch glucose check- 300s   After dinner glucose check- 300s    Fieberg disease   Used to follow with podiatrist for possible surgery    Tobacco use disorder  X20 years 2 PPD x 10 years, 5 cigs per day at this time. States patches make him sick. Goal cut a cigarette a week.     ----  Occupation      Diet  Eating 2 meals per day. Meal 1- bowel of cream of wheat. Meal 2-  sandwich. Snack- chips.Eating out at least 3 times per week.     Exercise  Active at work    Healthcare maintenance  Mood screening: PHQ2 score 0, SANG 7 score 0 12/2023  Drug screening: tobacco as above, social alcohol drinking   Sexual activity screening: females only, sexually active-- requesting STD testing  Infectious disease screening: HIV, hep C 12/2023  Colon cancer screening: n/a  Vaccines: pneumonia vaccine and tdap 12/2023 POC  Metabolic screening: A1c and lipid panel 12/2023  Osteoporosis screening: n/a    Review of Systems   Review of Systems   Constitutional:  Negative for chills, fever and weight loss.   Respiratory:  Negative for cough and shortness of breath.    Cardiovascular:  Negative for chest pain, palpitations and leg swelling.   Gastrointestinal:  Negative for constipation, diarrhea, nausea and vomiting.   Genitourinary:  Negative for dysuria.   Musculoskeletal:  Negative for myalgias.   Neurological:  Negative for dizziness and weakness.   Psychiatric/Behavioral:  Negative for depression.         Past Medical History:   Past Medical History:   Diagnosis Date    Diabetes ([facility])      Patient Active Problem List    Diagnosis Date Noted    Uncontrolled type 2 diabetes mellitus with hyperglycemia ([facility]) 12/07/2023    Gastroesophageal reflux disease without esophagitis 12/07/2023    Status post cholecystectomy 12/07/2023    Status post tonsillectomy 12/07/2023    History of nonadherence to medical treatment 12/07/2023    Tobacco use disorder 12/07/2023    Freiberg's disease, right 12/07/2023    Overweight 12/07/2023     Past Surgical History:   History reviewed. No pertinent surgical history.     Allergies:  Patient has no active allergies.    Medications:     Current Outpatient Medications:     insulin detemir, 10 Units, Subcutaneous, QAM    Lancets, Use one True Metrix lancet to test blood sugar three times daily.    Alcohol Swabs, Wipe site with prep pad prior to injection.    atorvastatin, 20 mg,  "Oral, Nightly     Social History:   Social History     Tobacco Use    Smoking status: Every Day     Current packs/day: 0.50     Average packs/day: 0.5 packs/day for 20.9 years (10.5 ttl pk-yrs)     Types: Cigarettes     Start date: 2003    Smokeless tobacco: Never   Vaping Use    Vaping Use: Former   Substance Use Topics    Alcohol use: Not Currently     Comment: socially    Drug use: Not Currently     Living with friend in [place]    Family History:   History reviewed. No pertinent family history.    Grandma- lung cancer (passed away from this)  Acid reflux  Does not know anything about his mothers side of family    Vitals:   /73 (BP Location: Left arm, Patient Position: Sitting, BP Cuff Size: Adult)   Pulse 81   Temp 36.9 °C (98.5 °F) (Temporal)   Ht 1.727 m (5' 8\")   Wt 81.8 kg (180 lb 6.4 oz)   SpO2 94%  Body mass index is 27.43 kg/m².    Physical Exam:   Physical Exam  Constitutional:       General: He is not in acute distress.     Appearance: Normal appearance. He is not ill-appearing or diaphoretic.   HENT:      Head: Normocephalic and atraumatic.      Mouth/Throat:      Mouth: Mucous membranes are moist.   Eyes:      Extraocular Movements: Extraocular movements intact.      Pupils: Pupils are equal, round, and reactive to light.   Cardiovascular:      Rate and Rhythm: Normal rate and regular rhythm.      Heart sounds: No murmur heard.     No friction rub. No gallop.   Pulmonary:      Effort: No respiratory distress.      Breath sounds: No stridor. No wheezing, rhonchi or rales.   Abdominal:      General: Bowel sounds are normal. There is no distension.      Tenderness: There is no abdominal tenderness. There is no guarding or rebound.   Musculoskeletal:      Right lower leg: No edema.      Left lower leg: No edema.   Skin:     Coloration: Skin is not jaundiced or pale.   Neurological:      General: No focal deficit present.      Mental Status: He is alert and oriented to person, place, and time.      " Comments: Monofilament testing abnl f/l feet unable to detect 1/5 toes    Psychiatric:         Mood and Affect: Mood normal.         Behavior: Behavior normal.     Assessment and Plan    Patient is a 33-year-old male with pertinent history of uncontrolled diabetes (possibly type I?),  in the setting of being overweight and tobacco use disorder in addition to medical nonadherence due to finances presenting to establish as new patient.  This visit, patient's insulin was increased with instruction for patient to keep glucose log and follow-up in 2 weeks for glucose log check and adjustment to insulin per Dr. Rayo followed by another glucose log check + fasting lab follow up in 5 weeks with Dr Puri:     #Diabetes melltius, ?type 2, uncontrolled   #Overweight  BMI 27 12/2023 with A1c 9.6 12/2023.  No apparent endorgan damage on lab work in the ED. 12/2023 patient with ED visit for hyperglycemia possibly HHS.  Patient with GI symptoms on metformin.  Appears to be highly insulin resistant despite BMI of only 27 and young age.  Is not fully aware of family history and is unsure if he has ever been tested for type 1 diabetes.  Reasonable to order antibody testing in addition to a fasting lipid panel and start him on statin.  Patient may not be able to obtain lab work until February or March due to financial reasons.  -Discontinue metformin 2000 mg per pt preference  -Insulin detemir 10 units every morning, increased to 15 units every morning and if patient subsequently without symptoms counseled to increase to 20 units every morning prior to 2-week follow-up visit.  Given high insulin resistance, likely that he will need further titration at 2-week follow-up (can do 5-10 units at a time with patient education)  -Atorvastatin 20 mg PO qdaily--counseled to start after lab work but if he cannot obtain lab work counseled to start taking medication  -Fasting lipid panel, type 1 DM testing, urine microalb/ pr ratio, vitamin  B12 levels (since patient recently on metformin)  -DM supplies  -Pneumococcal POC vaccine 12/2023  -Counseled on symptoms of hypoglycemia and to keep his blood glucose monitor and sugar snack with him at all times in case of symptoms  -Endocrinology referral-patient possible candidate for continuous glucose monitor or insulin pump  -Podiatry referral  -Nutrition referral  -Pharmacotherapy service referral  -Consider jardiance 10 mg PO qdaily once patient has insurance  -Consider switch to insulin NPH once patient runs out of detemir depending on cost.  NPH insulin is cheapest  -Point of care retinal exam at future visit    #Frieberg disease  Patient diagnosed at age of 22 and previously followed by podiatry for possible surgical intervention  -Podiatry referral placed 12/2023    #Tobacco use disorder  X20 years 2 PPD x 10 years, 5 cigs per day at this time. States patches make him sick.   -Motivational interviewing: goal is to quit by next visit, decreasing by 1 cigarette/week    #Medical non adherence due to financial constraints  Patient without medical insurance until February/March 2024 and has to pay out-of-pocket  -Patient refuses social work consultation at this time    #Healthcare maintenance  #STD screening, asymptomatic  Mood screening: PHQ2 score 0, SANG 7 score 0 12/2023  Drug screening: tobacco as above, social alcohol drinking   Sexual activity screening: females only, sexually active-- requesting STD testing  Infectious disease screening: HIV, hep C 12/2023  Colon cancer screening: n/a  Vaccines: pneumonia vaccine and tdap 12/2023  Metabolic screening: A1c and lipid panel 12/2023  Osteoporosis screening: n/a    -- CHRONIC AND RESOLVED MEDICAL PROBLEMS --     #Intermittent GERD, diet controlled     #Status post cholecystectomy 2014    #Status post tonsillectomy at 9 years of age    Please note that this dictation was created using voice recognition software. I have made every reasonable attempt to  correct obvious errors, but I expect that there are errors of grammar and possibly content that I did not discover before finalizing the note.    Patient case was seen/ assessed/ discussed with Dr. Enmanuel Puri, PGY-3  Internal Medicine

## 2023-12-07 NOTE — PATIENT INSTRUCTIONS
Start atorvastatin 20 mg daily after obtaining labs  Start jardiance 10 mg daily right away  Obtain fasting labs  Increase insulin 15 units qAM and then after 2 days increase to 20 units qAM  Keep glucose log and bring to next visit  2 week follow up with Dr Rayo  5 week follow up with Dr Puri

## 2023-12-19 ENCOUNTER — OFFICE VISIT (OUTPATIENT)
Dept: INTERNAL MEDICINE | Facility: OTHER | Age: 33
End: 2023-12-19

## 2023-12-19 VITALS
DIASTOLIC BLOOD PRESSURE: 71 MMHG | BODY MASS INDEX: 27.04 KG/M2 | WEIGHT: 178.4 LBS | OXYGEN SATURATION: 95 % | HEIGHT: 68 IN | HEART RATE: 77 BPM | TEMPERATURE: 97.6 F | SYSTOLIC BLOOD PRESSURE: 114 MMHG

## 2023-12-19 DIAGNOSIS — E11.65 UNCONTROLLED TYPE 2 DIABETES MELLITUS WITH HYPERGLYCEMIA (HCC): ICD-10-CM

## 2023-12-19 PROCEDURE — 3078F DIAST BP <80 MM HG: CPT | Performed by: STUDENT IN AN ORGANIZED HEALTH CARE EDUCATION/TRAINING PROGRAM

## 2023-12-19 PROCEDURE — 1125F AMNT PAIN NOTED PAIN PRSNT: CPT | Performed by: STUDENT IN AN ORGANIZED HEALTH CARE EDUCATION/TRAINING PROGRAM

## 2023-12-19 PROCEDURE — 99214 OFFICE O/P EST MOD 30 MIN: CPT | Mod: GC | Performed by: STUDENT IN AN ORGANIZED HEALTH CARE EDUCATION/TRAINING PROGRAM

## 2023-12-19 PROCEDURE — 3074F SYST BP LT 130 MM HG: CPT | Performed by: STUDENT IN AN ORGANIZED HEALTH CARE EDUCATION/TRAINING PROGRAM

## 2023-12-19 ASSESSMENT — FIBROSIS 4 INDEX: FIB4 SCORE: 0.74

## 2023-12-19 ASSESSMENT — PAIN SCALES - GENERAL: PAINLEVEL: 6=MODERATE PAIN

## 2023-12-19 NOTE — PROGRESS NOTES
Established Patient    Patient Care Team:  Bam Puri D.O. as PCP - General (Internal Medicine)    HPI:  José Miguel Bautista is a 33 y.o. male who presents today with the following Chief Complaint(s):   Chief Complaint   Patient presents with    Diabetes     Patient here for follow up    Foot Problem     Patient comes in today for follow up of diabetes. He has been checking his blood sugar regularly, however, lost blood sugar log. Levels checked through glucometer with average 200-250. Last 5 days reviewed showed FBS 79 on 12/14 -felt dizzy with resolution after eating. Highest readings were almost 400. He reports polyuria and polydipsia. Has been on Levemir 20U x 2 weeks, injecting in alternating sites. Has not yet seen nutritionist, reports diet has been ok -regularly eating air-fried chicken with veggies like broccoli, cauliflower and carrots, but states has been eating out a bit more this week. Other than that, noted pain to MTP of right great toe -reports new boots that have not been broken in yet. No noted wounds or nonhealing ulcers to feet. Podiatry referral previously sent, appointment in Feb 2024.  Not yet on statin therapy due to cost ($75 at [facility]).   Received PCV 20 during last appt, not interested in flu vaccine or COVID.    Review of Systems   12 point ROS reviewed and are negative except as mentioned in the HPI.      Past Medical History:   Diagnosis Date    Diabetes (HCC)        Patient Active Problem List    Diagnosis Date Noted    Uncontrolled type 2 diabetes mellitus with hyperglycemia (HCC) 12/07/2023    Gastroesophageal reflux disease without esophagitis 12/07/2023    Status post cholecystectomy 12/07/2023    Status post tonsillectomy 12/07/2023    History of nonadherence to medical treatment 12/07/2023    Tobacco use disorder 12/07/2023    Freiberg's disease, right 12/07/2023    Overweight 12/07/2023       Allergies:Patient has no known allergies.    Current Outpatient Medications  "  Medication Sig Dispense Refill    insulin detemir (LEVEMIR) 100 UNIT/ML Solution Inject 10 Units under the skin every morning.      Lancets Use one True Metrix lancet to test blood sugar three times daily. 100 Each 0    Alcohol Swabs Wipe site with prep pad prior to injection. 100 Each 0    atorvastatin (LIPITOR) 20 MG Tab Take 1 Tablet by mouth every evening for 90 days. 90 Tablet 0     No current facility-administered medications for this visit.       Social History     Tobacco Use    Smoking status: Every Day     Current packs/day: 0.50     Average packs/day: 0.5 packs/day for 21.0 years (10.5 ttl pk-yrs)     Types: Cigarettes     Start date: 2003    Smokeless tobacco: Never   Vaping Use    Vaping Use: Former   Substance Use Topics    Alcohol use: Not Currently     Comment: socially    Drug use: Not Currently       No family history on file.      Physical Exam  Vitals:  /71 (BP Location: Right arm, Patient Position: Sitting, BP Cuff Size: Adult)   Pulse 77   Temp 36.4 °C (97.6 °F) (Temporal)   Ht 1.727 m (5' 8\")   Wt 80.9 kg (178 lb 6.4 oz)   SpO2 95%  Body mass index is 27.13 kg/m².  Constitutional:  Not in acute distress, well appearing.  HEENT:   NC/AT, EOMI, conjunctiva normal, hearing grossly intact  Cardiovascular: Regular rate and rhythm. No murmurs or gallops.      Lungs:   Clear to auscultation bilaterally. No wheezes or crackles. No respiratory distress.  Abdomen: Not distended, soft, not tender  Extremities: Erythema and tenderness to R MTP of great toe, no obvious signs or concerns for infection, no open wounds noted  Skin:  Warm and dry.  No visible rashes.  Neurologic: Alert & oriented x 3, CN II-XII grossly intact, no focal deficits noted.  Psychiatric:  Affect normal, mood normal, judgment normal.      Assessment and Plan:     Problem List Items Addressed This Visit       Uncontrolled type 2 diabetes mellitus with hyperglycemia (HCC)     A1c 9.4%, 12/4/23  Blood glucose levels checked " through glucometer with average 200-250  Noted hypoglycemia episode with FBS 79 on 12/14   Highest readings were almost 400  +polyuria and polydipsia  Has been on Levemir 20U x 2 weeks, not on metformin due to intolerance (GI upset)   -Increase Levemir to 25U daily (currently has 4mos supply left), adjust dose as necessary. May consider NPH at future visit due to costs, as appropriate. Educated patient on how to administer insulin to prevent lipohypertrophy  -Encouraged to keep blood sugar log and bring to next appointment  -Educated patient on symptoms of hypoglycemia. Also noted, blood sugar levels fluctuate based on food intake and level of activity. Advised if with hypoglycemia episode to note sugar level, when, and symptoms. Advised to keep candy or juice on him at all times. Glucose tabs also sent to pharmacy  -Has not yet seen nutritionist, handout given for dietary changes in the meantime  -Labs not yet done, advised to get it done prior to follow up appt with Dr Puri on 1/15/24  -Unable to do POC retinal exam at visit today, consider at future visit  -CTM feet for any nonhealing wounds or concerns for infection  -Encouraged to use GoodRx in order to obtain statin (can get medication for 1 month supply for around $20 or less)  -Pneumococcal vaccine UTD, declines flu and COVID vaccines          Return in about 4 weeks (around 1/16/2024).    Jeannie Rayo M.D. PGY-3  [facility]

## 2023-12-20 NOTE — PATIENT INSTRUCTIONS
Thank you for coming in today, José Miguel!  Please increase your insulin to 25U daily. When injecting your insulin, ensure you pinch your skin to inject the insulin properly and spread it by 1cm across your abdomen. Avoid injecting in the same spot daily and around the belly button as it can decrease absorption  Watch out for symptoms of low blood sugar:  Looking pale  Shakiness  Sweating  Headache  Hunger or nausea  An irregular or fast heartbeat  Fatigue  Irritability or anxiety  Difficulty concentrating  Dizziness or lightheadedness  Tingling or numbness of the lips, tongue or cheek  Please keep candies or some juices with you at all times in case you have these symptoms. I have also sent glucose  tabs to your pharmacy as well  Please get your labs done, as possible, before you see Dr. Puri  Please continue to monitor your blood sugars and keep a log (with noted times) - note any symptoms you may have and where your blood sugar is at so that your medications may be adjusted appropriately.  Check your blood sugars before bedtime, 2 hours after breakfast, 2 hours after lunch, 2 hours after dinner (can slowly incorporate this frequent checking as much as it fits in your schedule)  Attached is a clinical reference for dietary changes you can apply before seeing the nutritionist  Please continue to monitor your feet for any wounds, if you notice any that are nonhealing or are concerned for infections then please let Dr Puri know  Try GoodRx to get discounted medications (namely your atorvastatin)  Follow up with Dr. Puri in 4 weeks

## 2023-12-20 NOTE — ASSESSMENT & PLAN NOTE
A1c 9.4%, 12/4/23  Blood glucose levels checked through glucometer with average 200-250  Noted hypoglycemia episode with FBS 79 on 12/14   Highest readings were almost 400  +polyuria and polydipsia  Has been on Levemir 20U x 2 weeks, not on metformin due to intolerance (GI upset)   -Increase Levemir to 25U daily (currently has 4mos supply left), adjust dose as necessary. May consider NPH at future visit due to costs, as appropriate. Educated patient on how to administer insulin to prevent lipohypertrophy  -Encouraged to keep blood sugar log and bring to next appointment  -Educated patient on symptoms of hypoglycemia. Also noted, blood sugar levels fluctuate based on food intake and level of activity. Advised if with hypoglycemia episode to note sugar level, when, and symptoms. Advised to keep candy or juice on him at all times. Glucose tabs also sent to pharmacy  -Has not yet seen nutritionist, handout given for dietary changes in the meantime  -Labs not yet done, advised to get it done prior to follow up appt with Dr Puri on 1/15/24  -Unable to do POC retinal exam at visit today, consider at future visit  -CTM feet for any nonhealing wounds or concerns for infection  -Encouraged to use GoodRx in order to obtain statin (can get medication for 1 month supply for around $20 or less)  -Pneumococcal vaccine UTD, declines flu and COVID vaccines

## 2024-01-15 ENCOUNTER — TELEPHONE (OUTPATIENT)
Dept: HEALTH INFORMATION MANAGEMENT | Facility: OTHER | Age: 34
End: 2024-01-15

## 2024-04-02 ENCOUNTER — HOSPITAL ENCOUNTER (EMERGENCY)
Facility: MEDICAL CENTER | Age: 34
End: 2024-04-02
Attending: EMERGENCY MEDICINE
Payer: COMMERCIAL

## 2024-04-02 VITALS
SYSTOLIC BLOOD PRESSURE: 125 MMHG | RESPIRATION RATE: 18 BRPM | TEMPERATURE: 98.6 F | HEART RATE: 64 BPM | BODY MASS INDEX: 26.56 KG/M2 | HEIGHT: 68 IN | OXYGEN SATURATION: 95 % | WEIGHT: 175.27 LBS | DIASTOLIC BLOOD PRESSURE: 66 MMHG

## 2024-04-02 DIAGNOSIS — R73.9 HYPERGLYCEMIA: ICD-10-CM

## 2024-04-02 LAB
ALBUMIN SERPL BCP-MCNC: 4.4 G/DL (ref 3.2–4.9)
ALBUMIN/GLOB SERPL: 1.6 G/DL
ALP SERPL-CCNC: 144 U/L (ref 30–99)
ALT SERPL-CCNC: 21 U/L (ref 2–50)
ANION GAP SERPL CALC-SCNC: 14 MMOL/L (ref 7–16)
AST SERPL-CCNC: 16 U/L (ref 12–45)
BASOPHILS # BLD AUTO: 0.5 % (ref 0–1.8)
BASOPHILS # BLD: 0.04 K/UL (ref 0–0.12)
BILIRUB SERPL-MCNC: 0.7 MG/DL (ref 0.1–1.5)
BUN SERPL-MCNC: 11 MG/DL (ref 8–22)
CALCIUM ALBUM COR SERPL-MCNC: 8.9 MG/DL (ref 8.5–10.5)
CALCIUM SERPL-MCNC: 9.2 MG/DL (ref 8.5–10.5)
CHLORIDE SERPL-SCNC: 96 MMOL/L (ref 96–112)
CO2 SERPL-SCNC: 21 MMOL/L (ref 20–33)
CREAT SERPL-MCNC: 0.78 MG/DL (ref 0.5–1.4)
EOSINOPHIL # BLD AUTO: 0.08 K/UL (ref 0–0.51)
EOSINOPHIL NFR BLD: 1 % (ref 0–6.9)
ERYTHROCYTE [DISTWIDTH] IN BLOOD BY AUTOMATED COUNT: 35.2 FL (ref 35.9–50)
GFR SERPLBLD CREATININE-BSD FMLA CKD-EPI: 119 ML/MIN/1.73 M 2
GLOBULIN SER CALC-MCNC: 2.8 G/DL (ref 1.9–3.5)
GLUCOSE BLD STRIP.AUTO-MCNC: 392 MG/DL (ref 65–99)
GLUCOSE BLD STRIP.AUTO-MCNC: 423 MG/DL (ref 65–99)
GLUCOSE SERPL-MCNC: 569 MG/DL (ref 65–99)
HCT VFR BLD AUTO: 46.7 % (ref 42–52)
HGB BLD-MCNC: 17.4 G/DL (ref 14–18)
IMM GRANULOCYTES # BLD AUTO: 0.04 K/UL (ref 0–0.11)
IMM GRANULOCYTES NFR BLD AUTO: 0.5 % (ref 0–0.9)
LYMPHOCYTES # BLD AUTO: 2.44 K/UL (ref 1–4.8)
LYMPHOCYTES NFR BLD: 30.1 % (ref 22–41)
MCH RBC QN AUTO: 30.2 PG (ref 27–33)
MCHC RBC AUTO-ENTMCNC: 37.3 G/DL (ref 32.3–36.5)
MCV RBC AUTO: 81.1 FL (ref 81.4–97.8)
MONOCYTES # BLD AUTO: 0.52 K/UL (ref 0–0.85)
MONOCYTES NFR BLD AUTO: 6.4 % (ref 0–13.4)
NEUTROPHILS # BLD AUTO: 4.99 K/UL (ref 1.82–7.42)
NEUTROPHILS NFR BLD: 61.5 % (ref 44–72)
NRBC # BLD AUTO: 0 K/UL
NRBC BLD-RTO: 0 /100 WBC (ref 0–0.2)
PLATELET # BLD AUTO: 251 K/UL (ref 164–446)
PMV BLD AUTO: 11.1 FL (ref 9–12.9)
POTASSIUM SERPL-SCNC: 4 MMOL/L (ref 3.6–5.5)
PROT SERPL-MCNC: 7.2 G/DL (ref 6–8.2)
RBC # BLD AUTO: 5.76 M/UL (ref 4.7–6.1)
SODIUM SERPL-SCNC: 131 MMOL/L (ref 135–145)
WBC # BLD AUTO: 8.1 K/UL (ref 4.8–10.8)

## 2024-04-02 PROCEDURE — 80053 COMPREHEN METABOLIC PANEL: CPT

## 2024-04-02 PROCEDURE — 85025 COMPLETE CBC W/AUTO DIFF WBC: CPT

## 2024-04-02 PROCEDURE — 36415 COLL VENOUS BLD VENIPUNCTURE: CPT

## 2024-04-02 PROCEDURE — 700102 HCHG RX REV CODE 250 W/ 637 OVERRIDE(OP): Performed by: EMERGENCY MEDICINE

## 2024-04-02 PROCEDURE — 700105 HCHG RX REV CODE 258: Performed by: EMERGENCY MEDICINE

## 2024-04-02 PROCEDURE — 99284 EMERGENCY DEPT VISIT MOD MDM: CPT

## 2024-04-02 PROCEDURE — 82962 GLUCOSE BLOOD TEST: CPT

## 2024-04-02 PROCEDURE — 96372 THER/PROPH/DIAG INJ SC/IM: CPT

## 2024-04-02 RX ORDER — SODIUM CHLORIDE, SODIUM LACTATE, POTASSIUM CHLORIDE, CALCIUM CHLORIDE 600; 310; 30; 20 MG/100ML; MG/100ML; MG/100ML; MG/100ML
1000 INJECTION, SOLUTION INTRAVENOUS ONCE
Status: COMPLETED | OUTPATIENT
Start: 2024-04-02 | End: 2024-04-02

## 2024-04-02 RX ADMIN — INSULIN HUMAN 10 UNITS: 100 INJECTION, SOLUTION PARENTERAL at 14:34

## 2024-04-02 RX ADMIN — SODIUM CHLORIDE, POTASSIUM CHLORIDE, SODIUM LACTATE AND CALCIUM CHLORIDE 1000 ML: 600; 310; 30; 20 INJECTION, SOLUTION INTRAVENOUS at 14:03

## 2024-04-02 RX ADMIN — SODIUM CHLORIDE, POTASSIUM CHLORIDE, SODIUM LACTATE AND CALCIUM CHLORIDE 1000 ML: 600; 310; 30; 20 INJECTION, SOLUTION INTRAVENOUS at 12:25

## 2024-04-02 ASSESSMENT — FIBROSIS 4 INDEX: FIB4 SCORE: 0.76

## 2024-04-02 NOTE — ED NOTES
lab from Lab called with critical result of blood sugar at 569. Critical lab result read back to Hector.   Dr. Young notified of critical lab result at 1332.  Critical lab result read back by Dr. Young.

## 2024-04-02 NOTE — DISCHARGE INSTRUCTIONS
You were seen in the Emergency Department for high blood sugar.    Labs were otherwise completed without significant acute abnormalities.    Please use 1,000mg of tylenol or 600mg of ibuprofen every 6 hours as needed for pain.    Please restart your insulin and drink plenty of fluids.    Please follow up with your primary care physician as scheduled.    Return to the Emergency Department with abdominal pain, persistent vomiting, severe lightheadedness or fainting, or other concerns.

## 2024-04-02 NOTE — ED PROVIDER NOTES
ED Provider Note    CHIEF COMPLAINT  Chief Complaint   Patient presents with    High Blood Sugar     FSBG 423. Patient ran out of insulin on Sunday. Xerostomia is only symptom. Here for med refill of insulin     EXTERNAL RECORDS REVIEWED  The patient was last seen at the [facility] internal medicine clinic in December. He has a history of diabetes. They increased Levimir to 25 untis at that time. They discussed possible need for short acting insulin in the future.     HPI/ROS  LIMITATION TO HISTORY   Select: : None  OUTSIDE HISTORIAN(S):  None noted    José Miguel Bautista is a 34 y.o. male with a history of diabetes who presents to the Emergency Department with hyperglycemia. The patient takes his normal regimen of Levemir of 25 units but states he ran out Saturday and now his sugars have been high in the 300s. The patient notes he is switching primary care providers and has an appointment at a new clinic. He states he has a dry mouth but denies any vomiting. When he takes his insulin he states his sugars typically are in the 150s. The patient denies any cold-like symptoms.  The patient adds he has had cysts on the back of his head the last 8 years. He notes he previously been on Doxycycline for his cysts. He states occasionally he has drainage from the area.     PAST MEDICAL HISTORY  Past Medical History:   Diagnosis Date    Diabetes (HCC)         SURGICAL HISTORY  History reviewed. No pertinent surgical history.     FAMILY HISTORY  History reviewed. No pertinent family history.    SOCIAL HISTORY   reports that he has been smoking cigarettes. He started smoking about 21 years ago. He has a 10.6 pack-year smoking history. He has never used smokeless tobacco. He reports that he does not currently use alcohol. He reports that he does not use drugs.    CURRENT MEDICATIONS  Previous Medications    ALCOHOL SWABS    Wipe site with prep pad prior to injection.    INSULIN DETEMIR (LEVEMIR) 100 UNIT/ML SOLUTION    Inject 10 Units  "under the skin every morning.    LANCETS    Use one True Metrix lancet to test blood sugar three times daily.    NUTRITIONAL SUPPLEMENTS (GLUCOSE MANAGEMENT) TAB    Take 4 Tablets by mouth 1 time a day as needed (for low blood sugars).       ALLERGIES  Patient has no known allergies.    PHYSICAL EXAM  /87   Pulse 74   Temp 37.1 °C (98.8 °F) (Temporal)   Resp 16   Ht 1.727 m (5' 8\")   Wt 79.5 kg (175 lb 4.3 oz)   SpO2 99%      Constitutional: Nontoxic appearing. Alert in no apparent distress.  HENT: Atraumatic. Bilateral external ears normal. Nose normal.  Moist mucous membranes.  Oropharynx clear. Two fluctuant cystic-like areas of swelling on the scalp 1-2 cm each. No surrounding erythema.  Eyes: Pupils are equal and reactive. Conjunctiva normal.   Neck: Supple, full range of motion  Heart: Regular rate and rhythm.  No murmurs.    Lungs: No respiratory distress, normal work of breathing. Lungs clear to auscultation bilaterally.  Abdomen Soft, no distention.  No tenderness to palpation.  Musculoskeletal: Atraumatic. No obvious deformities noted.  No lower extremity edema.  Skin: Warm, Dry.  No erythema, No rash.   Neurologic: Alert and oriented x3. Moving all extremities spontaneously without focal deficits.  Psychiatric: Affect normal, Mood normal, Appears appropriate and not intoxicated.    DIAGNOSTIC STUDIES / PROCEDURES      LABS  Labs Reviewed   CBC WITH DIFFERENTIAL - Abnormal; Notable for the following components:       Result Value    MCV 81.1 (*)     MCHC 37.3 (*)     RDW 35.2 (*)     All other components within normal limits   COMP METABOLIC PANEL - Abnormal; Notable for the following components:    Sodium 131 (*)     Glucose 569 (*)     Alkaline Phosphatase 144 (*)     All other components within normal limits   POCT GLUCOSE DEVICE RESULTS - Abnormal; Notable for the following components:    POC Glucose, Blood 423 (*)     All other components within normal limits   POCT GLUCOSE DEVICE RESULTS " - Abnormal; Notable for the following components:    POC Glucose, Blood 392 (*)     All other components within normal limits   ESTIMATED GFR       COURSE & MEDICAL DECISION MAKING    12:11 PM - Patient seen and examined at bedside. Discussed plan of care, including blood work and IV fluids for his hyperglycemia. Patient agrees to the plan of care. The patient will be resuscitated with 1L NS IV. Ordered for CBC with diff and CMP to evaluate his symptoms. He was informed to follow up with his primary care provider regarding his cysts and to potentially have them drained.       ASSESSMENT, COURSE AND PLAN  Care Narrative: Patient with history of insulin-dependent diabetes presents for medication refill for insulin as he has been out for the last few days.  He has normal vital signs on arrival and is currently relatively asymptomatic.  His blood sugar in triage was in the 400s.  Labs were performed showing a blood glucose of 569.  He is no evidence of elevated anion gap or acidosis concerning for DKA.  No other electrolyte abnormalities or renal dysfunction.  He has not had any recent fevers or infectious symptoms.  Will plan for fluid resuscitation and insulin followed by recheck of blood sugar and likely discharge home with refill of his medication.      3:34 PM - Upon reassessment, patient is resting comfortably with normal vital signs.  No new complaints at this time.  Stoney blood sugar in the 300s.  Discussed results with patient and/or family as well as importance of primary care follow up, scheduled in the next few days. Patient understands plan of care and strict return precautions for new or changing symptoms.     ADDITIONAL PROBLEM LIST  Problem #1: Hyperglycemia -related to recent insulin noncompliance, improved following fluids and insulin, will discharge with refill of his Levemir, he is scheduled for follow-up with his primary care physician within the next few days    Problem #2: Cyst of the scalp  -chronic, needs outpatient follow-up      DISPOSITION AND DISCUSSIONS  Escalation of care considered, and ultimately not performed:acute inpatient care management, however at this time, the patient is most appropriate for outpatient management      The patient will return for new or worsening symptoms and is stable at the time of discharge.    The patient is referred to a primary physician for blood pressure management, diabetic screening, and for all other preventative health concerns.    DISPOSITION:  Patient will be discharged home in stable condition.    FOLLOW UP:  Your primary care physician as scheduled          [facility], Emergency Dept  [facility] [facility] [address] [phone]    If symptoms worsen      OUTPATIENT MEDICATIONS:  Discharge Medication List as of 4/2/2024  3:59 PM        START taking these medications    Details   !! insulin detemir (LEVEMIR) 100 UNIT/ML injection PEN Inject 25 Units under the skin every evening for 14 days., Disp-6 mL, R-0, Normal       !! - Potential duplicate medications found. Please discuss with provider.          FINAL DIAGNOSIS  1. Hyperglycemia        The note accurately reflects work and decisions made by me.  Vandana Young M.D.  4/2/2024  5:41 PM     Traci LION (Ish), am scribing for, and in the presence of, Vandana Young M.D..    Electronically signed by: Traci Foster), 4/2/2024    Vandana LION M.D. personally performed the services described in this documentation, as scribed by Traci Londono in my presence, and it is both accurate and complete.

## 2024-04-02 NOTE — ED TRIAGE NOTES
José Miguel Bautista  34 y.o. male  Chief Complaint   Patient presents with    High Blood Sugar     FSBG 423. Patient ran out of insulin on Sunday. Xerostomia is only symptom. Here for med refill of insulin       Pt ambulatory to triage with steady gait for above complaint.     Pt is GCS 15, speaking in full sentences, follows commands and responds appropriately to questions. Resp are even and unlabored. Patient denies pain.     Pt placed in lobby. Pt educated on triage process. Pt encouraged to alert staff for any changes.       Vitals:    04/02/24 1119   BP: 124/87   Pulse: 74   Resp: 16   Temp: 37.1 °C (98.8 °F)   SpO2: 99%

## 2024-07-09 ENCOUNTER — HOSPITAL ENCOUNTER (EMERGENCY)
Facility: MEDICAL CENTER | Age: 34
End: 2024-07-09
Attending: EMERGENCY MEDICINE
Payer: COMMERCIAL

## 2024-07-09 VITALS
BODY MASS INDEX: 24.24 KG/M2 | RESPIRATION RATE: 18 BRPM | HEART RATE: 80 BPM | OXYGEN SATURATION: 95 % | DIASTOLIC BLOOD PRESSURE: 75 MMHG | SYSTOLIC BLOOD PRESSURE: 120 MMHG | WEIGHT: 159.39 LBS | TEMPERATURE: 98 F

## 2024-07-09 DIAGNOSIS — R20.2 PARESTHESIAS: ICD-10-CM

## 2024-07-09 PROCEDURE — 99282 EMERGENCY DEPT VISIT SF MDM: CPT

## 2024-07-09 ASSESSMENT — FIBROSIS 4 INDEX: FIB4 SCORE: 0.49

## 2024-08-02 ENCOUNTER — NON-PROVIDER VISIT (OUTPATIENT)
Dept: URGENT CARE | Facility: PHYSICIAN GROUP | Age: 34
End: 2024-08-02

## 2024-08-02 DIAGNOSIS — Z02.1 PRE-EMPLOYMENT DRUG SCREENING: ICD-10-CM

## 2024-08-02 PROCEDURE — 80305 DRUG TEST PRSMV DIR OPT OBS: CPT | Performed by: FAMILY MEDICINE

## 2024-08-12 ENCOUNTER — APPOINTMENT (OUTPATIENT)
Dept: PHYSICAL MEDICINE AND REHAB | Facility: MEDICAL CENTER | Age: 34
End: 2024-08-12
Payer: COMMERCIAL

## 2024-08-21 ENCOUNTER — APPOINTMENT (OUTPATIENT)
Dept: PHYSICAL MEDICINE AND REHAB | Facility: MEDICAL CENTER | Age: 34
End: 2024-08-21
Payer: MEDICAID

## 2024-08-28 ENCOUNTER — OFFICE VISIT (OUTPATIENT)
Dept: PHYSICAL MEDICINE AND REHAB | Facility: MEDICAL CENTER | Age: 34
End: 2024-08-28
Payer: MEDICAID

## 2024-08-28 VITALS
BODY MASS INDEX: 23.52 KG/M2 | DIASTOLIC BLOOD PRESSURE: 70 MMHG | TEMPERATURE: 98.3 F | HEART RATE: 69 BPM | HEIGHT: 68 IN | WEIGHT: 155.2 LBS | OXYGEN SATURATION: 93 % | SYSTOLIC BLOOD PRESSURE: 113 MMHG

## 2024-08-28 DIAGNOSIS — M79.671 CHRONIC FOOT PAIN, RIGHT: ICD-10-CM

## 2024-08-28 DIAGNOSIS — M21.611 BUNION, RIGHT: ICD-10-CM

## 2024-08-28 DIAGNOSIS — M92.71 FREIBERG'S DISEASE, RIGHT: ICD-10-CM

## 2024-08-28 DIAGNOSIS — G89.29 CHRONIC FOOT PAIN, RIGHT: ICD-10-CM

## 2024-08-28 DIAGNOSIS — E11.9 TYPE 2 DIABETES MELLITUS WITHOUT COMPLICATION, WITHOUT LONG-TERM CURRENT USE OF INSULIN (HCC): ICD-10-CM

## 2024-08-28 RX ORDER — EMPAGLIFLOZIN 10 MG/1
TABLET, FILM COATED ORAL
COMMUNITY
Start: 2024-04-18

## 2024-08-28 RX ORDER — TIRZEPATIDE 5 MG/.5ML
INJECTION, SOLUTION SUBCUTANEOUS
COMMUNITY
Start: 2024-08-21

## 2024-08-28 RX ORDER — SEMAGLUTIDE 0.68 MG/ML
INJECTION, SOLUTION SUBCUTANEOUS
COMMUNITY
Start: 2024-05-16

## 2024-08-28 RX ORDER — FLASH GLUCOSE SENSOR
KIT MISCELLANEOUS
COMMUNITY
Start: 2024-08-16

## 2024-08-28 ASSESSMENT — FIBROSIS 4 INDEX: FIB4 SCORE: 0.41

## 2024-08-28 ASSESSMENT — PATIENT HEALTH QUESTIONNAIRE - PHQ9: CLINICAL INTERPRETATION OF PHQ2 SCORE: 0

## 2024-08-28 ASSESSMENT — PAIN SCALES - GENERAL: PAINLEVEL: 8=MODERATE-SEVERE PAIN

## 2024-08-28 NOTE — PROGRESS NOTES
[facility] (Physical Medicine and Rehabilitation)  Sports Medicine& Interventional Spine   New Patient Visit    Date of Service: 8/28/2024    Chief complaint:   Chief Complaint   Patient presents with    New Patient      Paresthesias        HISTORY    HPI: José Miguel Bautista 34 y.o. male who presents today with right sided chronic foot pain.   He was referred to our clinic from the room for bilateral foot paresthesias however today patient predominantly only complains of continued right foot pain.  This pain has been present for the past 12 years.  He was previously diagnosed with Freiberg's disease by his podiatrist who at the time recommended.  However he was unable to have this completed due to the long recovery time.  He also has a history of poorly controlled type 2 diabetes with a last A1c of 12.6.  Patient does state however in the past 6 months he has lost a significant amount of weight and has changed his diet.  He states he has lost about 70 pounds since the start of the year.  His primary location of pain is at the right first distal interphalangeal joint.  Patient states that his bunion is quite painful.  He also reports pain near the base of the fifth metatarsal.  He denies any acute injury to the foot i.e. rolling the ankle or dropping any objects on the foot.  He has not had any recent foot x-rays.  His last podiatrist visit was about 10 years ago.  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        Psychological testing for pain as depression and pain commonly coexist and need to both be treated.     PHQ      12/7/2023     8:30 AM 8/28/2024     2:20 PM   Depression Screen (PHQ-2/PHQ-9)   PHQ-2 Total Score 0 0       Interpretation of PHQ-9 Total Score   Score Severity   1-4 No Depression   5-9 Mild Depression   10-14 Moderate Depression   15-19 Moderately Severe Depression   20-27 Severe Depression      Medical records review:  I reviewed the note from the referring provider  emergency department visit on 7/9/2024    Previous treatments: None    Physical Therapy: No    Medications the patient is tried: tylenol    Previous interventions: none    Previous surgeries to relieve the above pain:  none    ROS:     All other systems reviewed and negative.       PMHx:   Past Medical History:   Diagnosis Date    Diabetes (HCC)          Current Outpatient Medications on File Prior to Visit   Medication Sig Dispense Refill    Continuous Glucose Sensor (FREESTYLE NICHOLAS 14 DAY SENSOR) Misc CHANGE SENSOR EVERY 14 DAYS      JARDIANCE 10 MG Tab tablet       Semaglutide,0.25 or 0.5MG/DOS, (OZEMPIC, 0.25 OR 0.5 MG/DOSE,) 2 MG/3ML Solution Pen-injector inject (0.25MG) by subcutaneous route every week on the same day of each week for 4 weeks and increase to 0.5 mg on week 5 and 6. E13.9 JOSE ALEJANDRO      MOUNJARO 5 MG/0.5ML Solution Pen-injector INJECT 5MG SUBCUTANEOUSLY ONCE EVERY WEEK      Nutritional Supplements (GLUCOSE MANAGEMENT) Tab Take 4 Tablets by mouth 1 time a day as needed (for low blood sugars). 120 Tablet 1    insulin detemir (LEVEMIR) 100 UNIT/ML Solution Inject 10 Units under the skin every morning.      Lancets Use one True Metrix lancet to test blood sugar three times daily. 100 Each 0    Alcohol Swabs Wipe site with prep pad prior to injection. 100 Each 0     No current facility-administered medications on file prior to visit.        PSHx:   No past surgical history on file.  Family history   No family history on file.  Medications: reviewed on epic.   Outpatient Medications Marked as Taking for the 8/28/24 encounter (Office Visit) with Herbert Murry D.O.   Medication Sig Dispense Refill    Continuous Glucose Sensor (FREESTYLE NICHOLAS 14 DAY SENSOR) Misc CHANGE SENSOR EVERY 14 DAYS      JARDIANCE 10 MG Tab tablet       Semaglutide,0.25 or 0.5MG/DOS, (OZEMPIC, 0.25 OR 0.5 MG/DOSE,) 2 MG/3ML Solution Pen-injector inject (0.25MG) by subcutaneous route every week on the same day of each week for 4 weeks  and increase to 0.5 mg on week 5 and 6. E13.9 JOSE ALEJANDRO THOMPSONUNJARO 5 MG/0.5ML Solution Pen-injector INJECT 5MG SUBCUTANEOUSLY ONCE EVERY WEEK        Allergies:   No Known Allergies  Social Hx:   Social History     Socioeconomic History    Marital status: Single     Spouse name: Not on file    Number of children: Not on file    Years of education: Not on file    Highest education level: Not on file   Occupational History    Not on file   Tobacco Use    Smoking status: Every Day     Current packs/day: 0.50     Average packs/day: 0.5 packs/day for 21.7 years (10.8 ttl pk-yrs)     Types: Cigarettes     Start date:     Smokeless tobacco: Never   Vaping Use    Vaping status: Former   Substance and Sexual Activity    Alcohol use: Not Currently     Comment: socially    Drug use: Never    Sexual activity: Not on file   Other Topics Concern    Not on file   Social History Narrative    Not on file     Social Determinants of Health     Financial Resource Strain: Not on File (2024)    Received from [facility]    Financial Resource Strain     Financial Resource Strain: 0   Food Insecurity: Not on File (2024)    Received from [facility]    Food Insecurity     Food: 0   Transportation Needs: Not on File (2024)    Received from [facility]    Transportation Needs     Transportation: 0   Physical Activity: Not on File (2024)    Received from [facility]    Physical Activity     Physical Activity: 0   Stress: Not on File (2024)    Received from [facility]    Stress     Stress: 0   Social Connections: Not on File (2024)    Received from [facility]    Social Connections     Social Connections and Isolation: 0   Intimate Partner Violence: Not on file   Housing Stability: Not on File (2024)    Received from [facility]    Housing Stability     Housin         EXAMINATION     Physical Exam:   Vitals: /70 (BP Location: Right arm, Patient Position: Sitting, BP Cuff Size: Adult)    "Pulse 69   Temp 36.8 °C (98.3 °F) (Temporal)   Ht 1.727 m (5' 8\")   Wt 70.4 kg (155 lb 3.3 oz)   SpO2 93%     Constitutional:   Body Habitus: Body mass index is 23.6 kg/m².  Cooperation: Fully cooperates with exam  Appearance: Well-groomed, well-nourished, not disheveled     Eyes: No scleral icterus to suggest severe liver disease, no proptosis to suggest severe hyperthyroid  ENT -no obvious auditory deficits, no obvious tongue lesions, tongue midline, no facial droop   Skin -no rashes or lesions noted   Respiratory-  breathing comfortable on room air, no audible wheezing  Cardiovascular- capillary refills less than 2 seconds. No lower extremity edema is noted.   Gastrointestinal - no obvious abdominal masses, No tenderness to palpation in the abdomen  Psychiatric- alert and oriented ×3. Normal affect.     Musculoskeletal -   Attention placed to the right foot  Inspection: Significant overlap of multiple toes of the right particularly the fourth over the fifth and the second over the third toes respectively.  Obvious bunion present.  No obvious wounds or ulcers.  Palpation: Mild tenderness palpation over the base of the fifth metatarsal  Range of motion: Patient with difficulty actively extending or flexing the toes.  Normal range of motion with dorsiflexion and plantarflexion strength  Strength: 5/5 strength with dorsiflexion, plantarflexion, inversion, eversion  Negative drawer testing  Normal sensation throughout bilateral feet with both light touch and pinprick.  Normal proprioception throughout bilateral feet.      MEDICAL DECISION MAKING    Medical records review: see under HPI section.     DATA    Labs:   Lab Results   Component Value Date/Time    SODIUM 131 (L) 04/02/2024 12:25 PM    POTASSIUM 4.0 04/02/2024 12:25 PM    CHLORIDE 96 04/02/2024 12:25 PM    CO2 21 04/02/2024 12:25 PM    ANION 14.0 04/02/2024 12:25 PM    GLUCOSE 569 (HH) 04/02/2024 12:25 PM    BUN 11 04/02/2024 12:25 PM    CREATININE 0.78 " "04/02/2024 12:25 PM    CALCIUM 9.2 04/02/2024 12:25 PM    ASTSGOT 16 04/02/2024 12:25 PM    ALTSGPT 21 04/02/2024 12:25 PM    TBILIRUBIN 0.7 04/02/2024 12:25 PM    ALBUMIN 4.4 04/02/2024 12:25 PM    TOTPROTEIN 7.2 04/02/2024 12:25 PM    GLOBULIN 2.8 04/02/2024 12:25 PM    AGRATIO 1.6 04/02/2024 12:25 PM   ]    No results found for: \"PROTHROMBTM\", \"INR\"     Lab Results   Component Value Date/Time    WBC 7.5 04/19/2024 09:06 AM    WBC 8.1 04/02/2024 12:25 PM    RBC 5.92 (H) 04/19/2024 09:06 AM    RBC 5.76 04/02/2024 12:25 PM    HEMOGLOBIN 18.0 (H) 04/19/2024 09:06 AM    HEMOGLOBIN 17.4 04/02/2024 12:25 PM    HEMATOCRIT 51.0 04/19/2024 09:06 AM    HEMATOCRIT 46.7 04/02/2024 12:25 PM    MCV 86 04/19/2024 09:06 AM    MCV 81.1 (L) 04/02/2024 12:25 PM    MCH 30.4 04/19/2024 09:06 AM    MCH 30.2 04/02/2024 12:25 PM    MCHC 35.3 04/19/2024 09:06 AM    MCHC 37.3 (H) 04/02/2024 12:25 PM    MPV 11.1 04/02/2024 12:25 PM    NEUTSPOLYS 60 04/19/2024 09:06 AM    NEUTSPOLYS 61.50 04/02/2024 12:25 PM    LYMPHOCYTES 33 04/19/2024 09:06 AM    LYMPHOCYTES 30.10 04/02/2024 12:25 PM    MONOCYTES 5 04/19/2024 09:06 AM    MONOCYTES 6.40 04/02/2024 12:25 PM    EOSINOPHILS 1 04/19/2024 09:06 AM    EOSINOPHILS 1.00 04/02/2024 12:25 PM    BASOPHILS 0 04/19/2024 09:06 AM    BASOPHILS 0.50 04/02/2024 12:25 PM        Lab Results   Component Value Date/Time    HBA1C 9.6 (H) 12/04/2023 05:51 PM        Imaging:   None for review    Diagnosis   Visit Diagnoses     ICD-10-CM   1. Chronic foot pain, right  M79.671    G89.29   2. Bunion, right  M21.611   3. Freiberg's disease, right  M92.71   4. Type 2 diabetes mellitus without complication, without long-term current use of insulin ([facility])  E11.9         ASSESSMENT AND PLAN:  José Miguel Bautista 34 y.o. male presents today for initial evaluation of chronic right foot pain.  Patient has a history of poorly controlled type 2 diabetes with last A1c 12.6.  He also has a reported history of Freiberg's disease and " was recommended surgical correction however this was never completed.  He has significant bunion deformity at the right foot as well as multiple other deformities throughout the toes of the right foot.  His exam was otherwise normal with normal sensation and normal strength.  A right foot x-ray was ordered for further evaluation of the lateral right foot pain to rule out any fractures.  A podiatry referral was also provided for further evaluation of his chronic foot pain as well as evaluation of potential need for surgical correction of the reported Freiberg's disease.  Discussed with patient the prognosis of continued elevated A1c including potential concerns for chronic foot ulcers/infection/Charcot foot/amputation.  Given his normal sensory exam I do not feel an EMG is warranted at this time.     José Miguel was seen today for new patient.    Diagnoses and all orders for this visit:    Chronic foot pain, right    Bunion, right    Freiberg's disease, right    Type 2 diabetes mellitus without complication, without long-term current use of insulin (HCC)      PLAN    Diagnostic workup: Right foot x-ray      Referrals: Podiatry    Follow-up: As needed      Please note that this dictation was created using voice recognition software. I have made every reasonable attempt to correct obvious errors but there may be errors of grammar and content that I may have overlooked prior to finalization of this note.    Herbert Murry DO  Physical Medicine and Rehabilitation  Sports Medicine and Spine  [facility]

## 2024-09-07 ENCOUNTER — HOSPITAL ENCOUNTER (OUTPATIENT)
Dept: RADIOLOGY | Facility: MEDICAL CENTER | Age: 34
End: 2024-09-07
Attending: STUDENT IN AN ORGANIZED HEALTH CARE EDUCATION/TRAINING PROGRAM
Payer: MEDICAID

## 2024-09-07 PROCEDURE — 73630 X-RAY EXAM OF FOOT: CPT | Mod: RT

## 2024-09-19 ENCOUNTER — NON-PROVIDER VISIT (OUTPATIENT)
Dept: URGENT CARE | Facility: PHYSICIAN GROUP | Age: 34
End: 2024-09-19

## 2024-09-19 DIAGNOSIS — Z02.83 ENCOUNTER FOR DRUG SCREENING: ICD-10-CM

## 2024-09-19 DIAGNOSIS — Z02.1 PRE-EMPLOYMENT DRUG SCREENING: ICD-10-CM

## 2024-09-19 LAB
AMP AMPHETAMINE: NORMAL
BAR BARBITURATES: NORMAL
BZO BENZODIAZEPINES: NORMAL
COC COCAINE: NORMAL
INT CON NEG: NORMAL
INT CON POS: NORMAL
MDMA ECSTASY: NORMAL
MET METHAMPHETAMINES: NORMAL
MTD METHADONE: NORMAL
OPI OPIATES: NORMAL
OXY OXYCODONE: NORMAL
PCP PHENCYCLIDINE: NORMAL
POC URINE DRUG SCREEN OCDRS: NEGATIVE
THC: NORMAL

## 2024-09-19 PROCEDURE — 80305 DRUG TEST PRSMV DIR OPT OBS: CPT | Performed by: NURSE PRACTITIONER

## 2024-09-19 NOTE — PROGRESS NOTES
José Miguel Marcosengale is a 34 y.o. male here for a non-provider visit for a pre-employment UDS.

## 2025-02-11 ENCOUNTER — APPOINTMENT (OUTPATIENT)
Dept: RADIOLOGY | Facility: MEDICAL CENTER | Age: 35
End: 2025-02-11
Attending: EMERGENCY MEDICINE
Payer: COMMERCIAL

## 2025-02-11 ENCOUNTER — HOSPITAL ENCOUNTER (EMERGENCY)
Facility: MEDICAL CENTER | Age: 35
End: 2025-02-11
Attending: EMERGENCY MEDICINE
Payer: COMMERCIAL

## 2025-02-11 VITALS
SYSTOLIC BLOOD PRESSURE: 114 MMHG | HEIGHT: 68 IN | HEART RATE: 77 BPM | RESPIRATION RATE: 17 BRPM | WEIGHT: 156.31 LBS | BODY MASS INDEX: 23.69 KG/M2 | DIASTOLIC BLOOD PRESSURE: 76 MMHG | OXYGEN SATURATION: 95 % | TEMPERATURE: 97.5 F

## 2025-02-11 DIAGNOSIS — S60.121A CONTUSION OF RIGHT INDEX FINGER WITH DAMAGE TO NAIL, INITIAL ENCOUNTER: ICD-10-CM

## 2025-02-11 PROCEDURE — 99283 EMERGENCY DEPT VISIT LOW MDM: CPT

## 2025-02-11 PROCEDURE — 73140 X-RAY EXAM OF FINGER(S): CPT | Mod: RT

## 2025-02-11 ASSESSMENT — FIBROSIS 4 INDEX: FIB4 SCORE: 0.42

## 2025-02-11 ASSESSMENT — PAIN DESCRIPTION - PAIN TYPE: TYPE: ACUTE PAIN

## 2025-02-11 NOTE — ED NOTES
Pt educated on discharge instructions. All questions & concerns addressed. Vitals re-assessed and at pt's baseline.     No IV to d/c     Pt ambulates to exit with steady gait and all belongings.

## 2025-02-11 NOTE — ED PROVIDER NOTES
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         EXTERNAL RECORDS REVIEWED  Visit from  reviewed for diabetes mellitus.  Patient has GERD.    HPI  José Miguel Bautista is a 35 y.o. male who presents to the Emergency Department right index finger pain over the distal phalanx and under the nail after getting it pinched between but an axle for about 2 seconds at work today.  No skin wounds.  There is edema.  He can still move the finger.      LIMITATION TO HISTORY   None     OUTSIDE HISTORIAN(S):  None    REVIEW OF SYSTEMS  Pertinent positives include: Right dominant index finger pain after crush injury.  Pertinent negatives include: Skin wound numbness loss of range of motion.    PAST MEDICAL HISTORY  Past Medical History:   Diagnosis Date    Diabetes (HCC)        SOCIAL HISTORY  Social History     Tobacco Use    Smoking status: Former     Current packs/day: 0.00     Average packs/day: 0.5 packs/day for 21.8 years (10.9 ttl pk-yrs)     Types: Cigarettes     Start date:      Quit date: 10/29/2024     Years since quittin.2    Smokeless tobacco: Never   Vaping Use    Vaping status: Former   Substance Use Topics    Alcohol use: Not Currently     Comment: socially    Drug use: Never     Social History     Substance and Sexual Activity   Drug Use Never       CURRENT MEDICATIONS  No current facility-administered medications for this encounter.    Current Outpatient Medications:     Continuous Glucose Sensor (FREESTYLE NICHOLAS 14 DAY SENSOR) Misc, CHANGE SENSOR EVERY 14 DAYS, Disp: , Rfl:     JARDIANCE 10 MG Tab tablet, , Disp: , Rfl:     Semaglutide,0.25 or 0.5MG/DOS, (OZEMPIC, 0.25 OR 0.5 MG/DOSE,) 2 MG/3ML Solution Pen-injector, inject (0.25MG) by subcutaneous route every week on the same day of each week for 4 weeks and increase to 0.5 mg on week 5 and 6. E13.9 JOSE ALEJANDRO, Disp: , Rfl:      "MOUNJARO 5 MG/0.5ML Solution Pen-injector, INJECT 5MG SUBCUTANEOUSLY ONCE EVERY WEEK, Disp: , Rfl:     Nutritional Supplements (GLUCOSE MANAGEMENT) Tab, Take 4 Tablets by mouth 1 time a day as needed (for low blood sugars)., Disp: 120 Tablet, Rfl: 1    insulin detemir (LEVEMIR) 100 UNIT/ML Solution, Inject 10 Units under the skin every morning., Disp: , Rfl:     Lancets, Use one True Metrix lancet to test blood sugar three times daily., Disp: 100 Each, Rfl: 0    Alcohol Swabs, Wipe site with prep pad prior to injection., Disp: 100 Each, Rfl: 0    ALLERGIES  No Known Allergies    PHYSICAL EXAM  VITAL SIGNS: BP 99/53   Pulse 73   Temp 36.5 °C (97.7 °F) (Temporal)   Resp 18   Ht 1.727 m (5' 8\")   Wt 70.9 kg (156 lb 4.9 oz)   SpO2 98% Comment: RA  BMI 23.77 kg/m²  Reviewed and afebrile  Constitutional: Well developed, Well nourished, well-appearing appears to be in pain.  HENT: Atraumatic.   Cardiovascular: Cap refill brisk.  Skin: Intact, small ungual hematoma  Musculoskeletal: Distal phalanx tender without deformity, range of motion at the PIP and DIP normal  Neurologic: Sensation intact on the pad      RADIOLOGY  I have independently interpreted the finger x-ray associated with this visit demonstrating no fracture.  I am awaiting the final reading from the radiologist which will be documented below.     Final Radiology Report  DX-FINGER(S) 2+ RIGHT   Final Result      Right second finger soft tissue swelling with no underlying bony or joint abnormality and no radiopaque foreign object.        Radiologist interpretation have been reviewed by me.       ED COURSE:    INTERVENTIONS BY ME:  Cautery offered but the patient does not seem to have a symptomatic subungual hematoma.  Tube gauze dressing placed for padding    ASSESSMENT, COURSE AND PLAN:  PROBLEMS EVALUATED THIS VISIT:    This patient presents with a finger contusion without fracture or dislocation.  There is a mild subungual injury but not a subungual " hematoma.  This is work-related.      DISPOSITION AND DISCUSSIONS    RISK:  Low    MY PLAN:  Ibuprofen acetaminophen padded dressing    C4 form completed    Finger contusion handout given    Followup:  [facility] [address] [address] [phone]  In 1 week  As needed if not better      CONDITION: Stable.     FINAL IMPRESSION  1. Contusion of right index finger with damage to nail, initial encounter         Mohsen Trevino M.D., 02/11/25  10:05 AM

## 2025-02-11 NOTE — LETTER
"    EMPLOYEE’S CLAIM FOR COMPENSATION/ REPORT OF INITIAL TREATMENT  FORM C-4  PLEASE TYPE OR PRINT    EMPLOYEE’S CLAIM - PROVIDE ALL INFORMATION REQUESTED   First Name                    NOA Valdez                  Last Name  Lily Birthdate                    1990                Sex  Male Claim Number (Insurer’s Use Only)     Home Address  [address] Age  35 y.o. 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 (if applicable)  taking a axle apart    How did this injury or occupational disease occur? (Be specific and answer in detail. Use additional sheet if necessary)  my finger got pinched between hub and axle   If you believe that you have an occupational disease, when did you first have knowledge of the disability and its relationship to your employment?  n/a Witnesses to the Accident (if applicable)  n/a      Nature of Injury or Occupational Disease  Workers' Compensation  Part(s) of Body Injured or Affected  Finger (R) N/A N/A    I CERTIFY THAT " THE ABOVE IS TRUE AND CORRECT TO T HE BEST OF MY KNOWLEDGE AND THAT I HAVE PROVIDED THIS INFORMATION IN ORDER TO OBTAIN THE BENEFITS OF [place], OR [place]).  I HEREBY AUTHORIZE ANY PHYSICIAN, CHIROPRACTOR, SURGEON, PRACTITIONER OR ANY OTHER PERSON, ANY HOSPITAL, INCLUDING [facility] OR [facility], ANY  MEDICAL SERVICE ORGANIZATION, ANY INSURANCE COMPANY, OR OTHER INSTITUTION OR ORGANIZATION TO RELEASE TO EACH OTHER, ANY MEDICAL OR OTHER INFORMATION, INCLUDING BENEFITS PAID OR PAYABLE, PERTINENT TO THIS INJURY OR DISEASE, EXCEPT INFORMATION RELATIVE TO DIAGNOSIS, TREATMENT AND/OR COUNSELING FOR AIDS, PSYCHOLOGICAL CONDITIONS, ALCOHOL OR CONTROLLED SUBSTANCES, FOR WHICH I MUST GIVE SPECIFIC AUTHORIZATION.  A PHOTOSTAT OF THIS AUTHORIZATION SHALL BE VALID AS THE ORIGINAL.     Date  02/11/2025   Place [facility] Employee’s Original or  *Electronic Signature   THIS REPORT MUST BE COMPLETED AND MAILED WITHIN 3 WORKING DAYS OF TREATMENT   Place  [facility], EMERGENCY DEPT    Name of Facility   ER   Date 2/11/2025 Diagnosis and Description of Injury or Occupational Disease  (S60.121A) Contusion of right index finger with damage to nail, initial encounter  The encounter diagnosis was Contusion of right index finger with damage to nail, initial encounter. Is there evidence that the injured employee was under the influence of alcohol and/or another controlled substance at the time of accident?  [x]No  [] Yes (if yes, please explain)   Hour  09:50 AM  No   Treatment: Nsaid, acetaminophen,     Have you advised the patient to remain off work five days or more?   [] Yes  [x] No        No           If yes, indicate dates: From   To      If no, is the injured employee capable of: [x] full duty Yes   [] modified duty    If yes to modified duty, specify any limitations / restrictions:       X-Ray Findings:  Negative  Comments:No fracture    From information given by the employee, together with medical evidence, can you directly connect this injury or occupational disease as job incurred?  []Yes   [x] No No    Is additional medical care by a physician indicated? [x]Yes [] No  Yes    Do you know of any previous injury or disease contributing to this condition or occupational disease? []Yes [x] No (Explain if yes)                          No   Date  2/11/2025 Print Health Care Provider’s Name  Leander Trevino I certify that the employer’s copy of  this form was delivered to the employer on:   Address  [address] [facility] [address]    INSURER'S USE ONLY                       [place] [facility] Zip   [address] Provider’s Tax ID Number   [id number]   Telephone  Dept: [phone]    Health Care Provider’s Original or Electronic Signature  e-LEANDER Louie M.D. Degree (MD,DO, DC,PA-C,APRN)  MD  Choose (if applicable)      ORIGINAL - TREATING HEALTHCARE PROVIDER PAGE 2 - INSURER/TPA PAGE 3 - EMPLOYER PAGE 4 - EMPLOYEE             Form C-4 (rev.08/23)

## 2025-02-11 NOTE — ED TRIAGE NOTES
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

## 2025-02-12 NOTE — Clinical Note
REFERRAL APPROVAL NOTICE         Sent on February 12, 2025                   José Miguel Bautista  [address] [facility] [address]                   Dear Mr. Bautista,    After a careful review of the medical information and benefit coverage, [facility] has processed your referral. See below for additional details.    If applicable, you must be actively enrolled with your insurance for coverage of the authorized service. If you have any questions regarding your coverage, please contact your insurance directly.    REFERRAL INFORMATION   Referral #:  [id number]  Referred-To Department    Referred-By Provider:  Occupational Medicine    Mohsen Trevino M.D.   n [address] Emergency Room  [address]  [facility] [address] [phone] [address] [facility] [address] [facility] [facility]  [address]  [facility] [id number] [phone]    Referral Start Date:  02/11/2025  Referral End Date:   02/11/2026             SCHEDULING  If you do not already have an appointment, please call [phone] to make an appointment.     MORE INFORMATION  If you do not already have a [employer] account, sign up at: [email or web address]  You can access your medical information, make appointments, see lab results, billing information, and more.  If you have questions regarding this referral, please contact  the [facility] Referrals department at:             [phone]. Monday - Friday 8:00AM - 5:00PM.     Sincerely,    [facility]

## 2025-06-10 ENCOUNTER — NON-PROVIDER VISIT (OUTPATIENT)
Dept: URGENT CARE | Facility: PHYSICIAN GROUP | Age: 35
End: 2025-06-10

## 2025-06-10 DIAGNOSIS — Z02.83 ENCOUNTER FOR DRUG SCREENING: Primary | ICD-10-CM

## 2025-06-10 DIAGNOSIS — Z02.1 PRE-EMPLOYMENT DRUG SCREENING: ICD-10-CM

## 2025-06-10 PROCEDURE — 80305 DRUG TEST PRSMV DIR OPT OBS: CPT
